# Patient Record
Sex: MALE | Race: WHITE | NOT HISPANIC OR LATINO | Employment: FULL TIME | URBAN - METROPOLITAN AREA
[De-identification: names, ages, dates, MRNs, and addresses within clinical notes are randomized per-mention and may not be internally consistent; named-entity substitution may affect disease eponyms.]

---

## 2017-04-28 DIAGNOSIS — Z94.0 LIVING-DONOR KIDNEY TRANSPLANT RECIPIENT: Chronic | ICD-10-CM

## 2017-04-28 DIAGNOSIS — Z94.0 KIDNEY TRANSPLANTED: ICD-10-CM

## 2017-04-28 DIAGNOSIS — N18.30 CKD (CHRONIC KIDNEY DISEASE) STAGE 3, GFR 30-59 ML/MIN: ICD-10-CM

## 2017-04-28 DIAGNOSIS — Z94.0 KIDNEY REPLACED BY TRANSPLANT: ICD-10-CM

## 2017-04-28 DIAGNOSIS — N05.1 FOCAL SEGMENTAL GLOMERULOSCLEROSIS: Chronic | ICD-10-CM

## 2017-04-28 DIAGNOSIS — Z94.0 IMMUNOSUPPRESSIVE MANAGEMENT ENCOUNTER FOLLOWING KIDNEY TRANSPLANT: Chronic | ICD-10-CM

## 2017-04-28 DIAGNOSIS — I12.0 HYPERTENSION, RENAL, STAGE 5 CHRONIC KIDNEY DISEASE OR END STAGE RENAL DISEASE: ICD-10-CM

## 2017-04-28 DIAGNOSIS — K76.0 FATTY LIVER: ICD-10-CM

## 2017-04-28 DIAGNOSIS — Z79.899 IMMUNOSUPPRESSIVE MANAGEMENT ENCOUNTER FOLLOWING KIDNEY TRANSPLANT: Chronic | ICD-10-CM

## 2017-04-28 RX ORDER — TACROLIMUS 1 MG/1
1 CAPSULE ORAL EVERY 12 HOURS
Qty: 60 CAPSULE | Refills: 3 | Status: SHIPPED | OUTPATIENT
Start: 2017-04-28 | End: 2017-10-30 | Stop reason: SDUPTHER

## 2017-04-28 RX ORDER — MYCOPHENOLATE MOFETIL 250 MG/1
500 CAPSULE ORAL EVERY 12 HOURS
Qty: 120 CAPSULE | Refills: 3 | Status: SHIPPED | OUTPATIENT
Start: 2017-04-28 | End: 2017-10-30 | Stop reason: SDUPTHER

## 2017-08-04 ENCOUNTER — DOCUMENTATION ONLY (OUTPATIENT)
Dept: TRANSPLANT | Facility: CLINIC | Age: 47
End: 2017-08-04

## 2017-08-04 LAB
EXT ALBUMIN: 3.7
EXT ALKALINE PHOSPHATASE: 103
EXT ALT: 62
EXT AST: 27
EXT BACTERIA UA: NORMAL
EXT BILIRUBIN DIRECT: 0.2 MG/DL
EXT BILIRUBIN TOTAL: 0.4
EXT BK VIRUS DNA QN PCR: NOT DETECTED
EXT BUN: 24
EXT CALCIUM: 8.9
EXT CHLORIDE: 103
EXT CO2: 26
EXT CREATININE: 1.49 MG/DL
EXT EOSINOPHIL%: 2
EXT GFR MDRD NON AF AMER: 50
EXT GLUCOSE UA: NORMAL
EXT GLUCOSE: 201
EXT HEMATOCRIT: 46.8
EXT HEMOGLOBIN: 15.5
EXT LYMPH%: 21
EXT MAGNESIUM: 2.1
EXT MONOCYTES%: 8
EXT NITRITES UA: NORMAL
EXT PHOSPHORUS: 2.1
EXT PLATELETS: 158
EXT POTASSIUM: 4.7
EXT PROT/CREAT RATIO UR: 0.73
EXT PROTEIN TOTAL: 6.9
EXT PROTEIN UA: 100
EXT SEGS%: 68
EXT SODIUM: 137 MMOL/L
EXT TACROLIMUS LVL: 6
EXT WBC UA: NORMAL
EXT WBC: 7.1

## 2017-08-10 NOTE — PROGRESS NOTES
Kidney Post-Transplant Assessment    Referring Physician: Bib Alfaro  Current Nephrologist: Bib Alfaro    ORGAN: LEFT KIDNEY  Donor Type: living  PHS Increased Risk: no  Cold Ischemia: 30 mins  Induction Medications: steroids (prednisone,methylprednisolone,solumedrol,medrol,decadron), campath - alemtuzumab (anti-cd52)    Subjective:      CC:  Reassessment of renal allograft function and management of chronic immunosuppression.    HPI:  Mr. Dockery is a 47 y.o. year old White male who received a living kidney transplant on 8/6/12.  He has CKD stage 3 - GFR 30-59 and his baseline creatinine is between 1.5-1.7, most recent sCr 1.49. He takes mycophenolate mofetil and tacrolimus for maintenance immunosuppression. He denies any recent hospitalizations or ER visits since his previous clinic visit.   Proteinuria since about 2015--> Most recent UPC 0.729 on 7/31/2017    Recently diagnosed with Diabetes type 2 this past year. Is taking Metformin 500 mg BID  Diabetes managed by general nephrologist   Lab Results   Component Value Date    HGBA1C 6.3 09/12/2016       Past Medical History:   Diagnosis Date    Anemia of chronic renal failure     CKD (chronic kidney disease) stage 4, GFR 15-29 ml/min     Depression     Focal segmental glomerulosclerosis 1986    biopsy proven    Gout, chronic     Hypertension, renal     Kidney transplant status, living related donor     Living-donor kidney transplant recipient 8/6/2012    Need for prophylactic immunotherapy 8/31/2012    Secondary hyperparathyroidism of renal origin     Vitamin D deficiency disease        Review of Systems   Constitutional: Negative for activity change, appetite change, chills, fatigue, fever and unexpected weight change.   HENT: Negative for congestion, facial swelling, postnasal drip, rhinorrhea, sinus pressure, sore throat and trouble swallowing.    Eyes: Negative for pain, redness and visual disturbance.   Respiratory: Negative for cough, chest  "tightness, shortness of breath and wheezing.    Cardiovascular: Negative.  Negative for chest pain, palpitations and leg swelling.   Gastrointestinal: Negative for abdominal pain, diarrhea, nausea and vomiting.   Genitourinary: Negative for dysuria, flank pain and urgency.   Musculoskeletal: Negative for gait problem, neck pain and neck stiffness.        Bilateral foot pain, diabetic neuropathy. Takes gabapentin, but feels it does not really help. Is planning on f/u with a podiatrist.    Skin: Negative for rash.   Allergic/Immunologic: Positive for immunocompromised state. Negative for environmental allergies and food allergies.   Neurological: Negative for dizziness, weakness, light-headedness and headaches.   Psychiatric/Behavioral: Negative for agitation and confusion. The patient is not nervous/anxious.        Objective:   Blood pressure 124/88, pulse 63, temperature 98.7 °F (37.1 °C), temperature source Oral, resp. rate 18, height 5' 9" (1.753 m), weight 110.4 kg (243 lb 6.2 oz), SpO2 99 %.body mass index is 35.94 kg/m².    Physical Exam   Constitutional: He is oriented to person, place, and time. He appears well-developed and well-nourished.   HENT:   Head: Normocephalic.   Mouth/Throat: Oropharynx is clear and moist. No oropharyngeal exudate.   Eyes: Conjunctivae and EOM are normal. Pupils are equal, round, and reactive to light. No scleral icterus.   Neck: Normal range of motion. Neck supple.   Cardiovascular: Normal rate, regular rhythm and normal heart sounds.    Pulmonary/Chest: Effort normal and breath sounds normal.   Abdominal: Soft. Normal appearance and bowel sounds are normal. He exhibits no distension and no mass. There is no splenomegaly or hepatomegaly. There is no tenderness. There is no rebound, no guarding, no CVA tenderness, no tenderness at McBurney's point and negative Zarco's sign.       Musculoskeletal: Normal range of motion. He exhibits no edema.   Lymphadenopathy:     He has no " cervical adenopathy.   Neurological: He is alert and oriented to person, place, and time. He exhibits normal muscle tone. Coordination normal.   Skin: Skin is warm and dry.   Psychiatric: He has a normal mood and affect. His behavior is normal.   Vitals reviewed.      Labs:  Lab Results   Component Value Date    EXTANC  10/11/2016      Comment:      repeat labs, hx proteinuria, creat elevated above baseline    EXTWBC 7.1 07/31/2017    EXTSEGS 68 07/31/2017    EXTPLATELETS 158 07/31/2017    EXTHEMOGLOBI 15.5 07/31/2017    EXTHEMATOCRI 46.8 07/31/2017    EXTCREATININ 1.49 07/31/2017    EXTSODIUM 137 07/31/2017    EXTPOTASSIUM 4.7 07/31/2017    EXTBUN 24 07/31/2017    EXTCO2 26 07/31/2017    EXTCALCIUM 8.9 07/31/2017    EXTPHOSPHORU 2.1 07/31/2017    EXTGLUCOSE 201 07/31/2017    EXTALBUMIN 3.7 07/31/2017    EXTAST 27 07/31/2017    EXTALT 62 07/31/2017    EXTBILITOTAL 0.4 07/31/2017       Lab Results   Component Value Date    EXTCYCLOSLVL  09/12/2016      Comment:      follow up labs after clinic visit    EXTTACROLVL 6.0 07/31/2017    EXTPROTCRE 0.729 07/31/2017    EXTPROTEINUA 100 07/31/2017    EXTWBCUA 0-5 07/31/2017    EXTRBCUA neg 08/17/2015       Labs were reviewed with the patient.    Assessment:     1. Living-donor kidney transplant recipient    2. Immunosuppressive management encounter following kidney transplant - Inducted with Campath 30 mg; Maintained on Prograf and Cellcept    3. CKD (chronic kidney disease) stage 3, GFR 30-59 ml/min    4. FSGS biopsy proven    5. Fatty liver    6. Hepatomegaly    7. Hypertension, renal, stage 1-4 or unspecified chronic kidney disease    8. Obesity, unspecified obesity severity, unspecified obesity type        Plan:   Needs to f/u with general nephrologist for:   CKD care,  proteinuria MgMt , good BP control and maintain appropriate weight   neuropathy--> f/u with podiatry as scheduled    Follow-up:   1. CKD stage: 3 stable    2. Immunosuppression:   Prograf Trough 6.0 ,  which is therapeutic--> target 4-6. Continue Prograf 1/1,  mg BID    Will continue to monitor for drug toxicities.  EXTTACROLVL 6.0 07/31/2017     3. Allograft Function: Stable.  Continue good po hydration .        EXTCREATININ 1.49 07/31/2017     4. Hypertension management: controlled, advise low salt diet and home BP monitoring   lisinopril 5 mg QD     5. Metabolic Bone Disease/Secondary Hyperparathyroidism:stable.    Will monitor Vit D, PTH and CA / protocol   EXTCALCIUM 8.9 07/31/2017   EXTPHOSPHORU 2.1 07/31/2017       6. Electrolytes: Normal calcium. Bicarbonate is normal     EXTSODIUM 137 07/31/2017   EXTPOTASSIUM 4.7 07/31/2017   EXTBUN 24 07/31/2017   EXTCO2 26 07/31/2017     7. Anemia: stable. No need for intervention       EXTWBC 7.1 07/31/2017   EXTSEGS 68 07/31/2017   EXTPLATELETS 158 07/31/2017   EXTHEMOGLOBI 15.5 07/31/2017   EXTHEMATOCRI 46.8 07/31/2017     8.  Cytopenias: no significant cytopenias will monitor as per our guidelines. Medicine list reviewed including potential causes of drug-induced cytopenias    9.Proteinuria: continue p/c ratio as per guidelines    MGMT deferred to general nephrologist   7/31/2017  EXT PROT/CREAT Ratio UR  0.729       10. BK virus infection screening:   will continue to monitor/ guidelines  7/31/2017  EXT BK Virus DNA QN PCR   not detected       11. Weight education: provided during the clinic visit   Body mass index is 35.94 kg/m².          12.Patient safety education regarding immunosuppression including prophylaxis posttransplant for CMV, PCP : Education provided about vaccination and prevention of infections       Follow-up:   Annual follow-up with kidney transplant clinic with repeat labs, including renal function panel with UA, urine protein/creatinine ratio, and drug trough level q3 months.  Patient also reminded to follow-up with general nephrologist.    Iona Lynch NP       Education:   Material provided to the patient.  Patient reminded to call  with any health changes since these can be early signs of significant complications.  Also, I advised the patient to be sure any new medications or changes of old medications are discussed with either a pharmacist or physician knowledgeable with transplant to avoid rejection/drug toxicity related to significant drug interactions.    UNOS Patient Status  Functional Status: 80% - Normal activity with effort: some symptoms of disease  Physical Capacity: No Limitations

## 2017-08-11 ENCOUNTER — OFFICE VISIT (OUTPATIENT)
Dept: TRANSPLANT | Facility: CLINIC | Age: 47
End: 2017-08-11
Payer: COMMERCIAL

## 2017-08-11 VITALS
SYSTOLIC BLOOD PRESSURE: 124 MMHG | HEART RATE: 63 BPM | HEIGHT: 69 IN | BODY MASS INDEX: 36.05 KG/M2 | DIASTOLIC BLOOD PRESSURE: 88 MMHG | OXYGEN SATURATION: 99 % | WEIGHT: 243.38 LBS | TEMPERATURE: 99 F | RESPIRATION RATE: 18 BRPM

## 2017-08-11 DIAGNOSIS — Z79.899 IMMUNOSUPPRESSIVE MANAGEMENT ENCOUNTER FOLLOWING KIDNEY TRANSPLANT: Chronic | ICD-10-CM

## 2017-08-11 DIAGNOSIS — N18.30 CKD (CHRONIC KIDNEY DISEASE) STAGE 3, GFR 30-59 ML/MIN: ICD-10-CM

## 2017-08-11 DIAGNOSIS — R16.0 HEPATOMEGALY: ICD-10-CM

## 2017-08-11 DIAGNOSIS — Z94.0 LIVING-DONOR KIDNEY TRANSPLANT RECIPIENT: Primary | Chronic | ICD-10-CM

## 2017-08-11 DIAGNOSIS — N05.1 FOCAL SEGMENTAL GLOMERULOSCLEROSIS: Chronic | ICD-10-CM

## 2017-08-11 DIAGNOSIS — E66.9 OBESITY, UNSPECIFIED OBESITY SEVERITY, UNSPECIFIED OBESITY TYPE: Chronic | ICD-10-CM

## 2017-08-11 DIAGNOSIS — Z94.0 IMMUNOSUPPRESSIVE MANAGEMENT ENCOUNTER FOLLOWING KIDNEY TRANSPLANT: Chronic | ICD-10-CM

## 2017-08-11 DIAGNOSIS — K76.0 FATTY LIVER: ICD-10-CM

## 2017-08-11 DIAGNOSIS — I12.9 HYPERTENSION, RENAL, STAGE 1-4 OR UNSPECIFIED CHRONIC KIDNEY DISEASE: ICD-10-CM

## 2017-08-11 PROCEDURE — 3008F BODY MASS INDEX DOCD: CPT | Mod: S$GLB,,, | Performed by: NURSE PRACTITIONER

## 2017-08-11 PROCEDURE — 3079F DIAST BP 80-89 MM HG: CPT | Mod: S$GLB,,, | Performed by: NURSE PRACTITIONER

## 2017-08-11 PROCEDURE — 3074F SYST BP LT 130 MM HG: CPT | Mod: S$GLB,,, | Performed by: NURSE PRACTITIONER

## 2017-08-11 PROCEDURE — 99999 PR PBB SHADOW E&M-EST. PATIENT-LVL IV: CPT | Mod: PBBFAC,,, | Performed by: NURSE PRACTITIONER

## 2017-08-11 PROCEDURE — 99214 OFFICE O/P EST MOD 30 MIN: CPT | Mod: S$GLB,,, | Performed by: NURSE PRACTITIONER

## 2017-08-11 RX ORDER — METFORMIN HYDROCHLORIDE 500 MG/1
500 TABLET ORAL 2 TIMES DAILY WITH MEALS
COMMUNITY
End: 2021-08-23

## 2017-08-11 RX ORDER — GABAPENTIN 300 MG/1
300 CAPSULE ORAL 2 TIMES DAILY
COMMUNITY

## 2017-08-11 NOTE — LETTER
August 11, 2017        Bib Alfaro  [address] [place] [address]  Phone: [phone]  Fax: [phone]             [facility]- [facility]  [address]  [facility] [address]  Phone: [phone]   Patient: Irving Dockery   MR Number: [id number]   YOB: 1970   Date of Visit: 8/11/2017       Dear Dr. iBb Alfaro    Thank you for referring Irving Dockery to me for evaluation. Attached you will find relevant portions of my assessment and plan of care.    If you have questions, please do not hesitate to call me. I look forward to following Irving Dockery along with you.    Sincerely,    Iona Lynch NP    Enclosure    If you would like to receive this communication electronically, please contact [email or web address] or [phone] to request [employer] Link access.    [employer] Link is a tool which provides read-only access to select patient information with whom you have a relationship. Its easy to use and provides real time access to review your patients record including encounter summaries, notes, results, and demographic information.    If you feel you have received this communication in error or would no longer like to receive these types of communications, please e-mail [email or web address]

## 2017-08-16 ENCOUNTER — COMMITTEE REVIEW (OUTPATIENT)
Dept: TRANSPLANT | Facility: CLINIC | Age: 47
End: 2017-08-16

## 2017-08-16 NOTE — COMMITTEE REVIEW
Discussion per Iona: plan of care     Will discuss with Iona what specifically the concern is     I was present at the meeting and attest to the decision of the committee.    Taniya Butler MD

## 2017-10-30 DIAGNOSIS — N05.1 FOCAL SEGMENTAL GLOMERULOSCLEROSIS: Chronic | ICD-10-CM

## 2017-10-30 DIAGNOSIS — N18.30 CKD (CHRONIC KIDNEY DISEASE) STAGE 3, GFR 30-59 ML/MIN: ICD-10-CM

## 2017-10-30 DIAGNOSIS — Z79.899 IMMUNOSUPPRESSIVE MANAGEMENT ENCOUNTER FOLLOWING KIDNEY TRANSPLANT: Chronic | ICD-10-CM

## 2017-10-30 DIAGNOSIS — Z94.0 LIVING-DONOR KIDNEY TRANSPLANT RECIPIENT: Chronic | ICD-10-CM

## 2017-10-30 DIAGNOSIS — Z94.0 IMMUNOSUPPRESSIVE MANAGEMENT ENCOUNTER FOLLOWING KIDNEY TRANSPLANT: Chronic | ICD-10-CM

## 2017-10-30 DIAGNOSIS — K76.0 FATTY LIVER: ICD-10-CM

## 2017-10-30 DIAGNOSIS — Z94.0 KIDNEY TRANSPLANTED: ICD-10-CM

## 2017-10-30 DIAGNOSIS — Z94.0 KIDNEY REPLACED BY TRANSPLANT: ICD-10-CM

## 2017-10-30 RX ORDER — TACROLIMUS 1 MG/1
1 CAPSULE ORAL EVERY 12 HOURS
Qty: 60 CAPSULE | Refills: 11 | Status: SHIPPED | OUTPATIENT
Start: 2017-10-30 | End: 2018-03-13 | Stop reason: SDUPTHER

## 2017-10-30 RX ORDER — MYCOPHENOLATE MOFETIL 250 MG/1
500 CAPSULE ORAL EVERY 12 HOURS
Qty: 120 CAPSULE | Refills: 11 | Status: SHIPPED | OUTPATIENT
Start: 2017-10-30 | End: 2018-03-13 | Stop reason: SDUPTHER

## 2017-10-30 NOTE — TELEPHONE ENCOUNTER
----- Message from Marcus Roman sent at 10/30/2017  4:17 PM CDT -----  Contact: patient  Patient called about medication refill. mycophenolate (CELLCEPT) 250 mg Cap and tacrolimus (PROGRAF) 1 MG Cap        Please call [phone]      Thanks!!!

## 2018-03-13 DIAGNOSIS — Z94.0 IMMUNOSUPPRESSIVE MANAGEMENT ENCOUNTER FOLLOWING KIDNEY TRANSPLANT: Chronic | ICD-10-CM

## 2018-03-13 DIAGNOSIS — N18.30 CKD (CHRONIC KIDNEY DISEASE) STAGE 3, GFR 30-59 ML/MIN: ICD-10-CM

## 2018-03-13 DIAGNOSIS — Z94.0 LIVING-DONOR KIDNEY TRANSPLANT RECIPIENT: Chronic | ICD-10-CM

## 2018-03-13 DIAGNOSIS — Z79.899 IMMUNOSUPPRESSIVE MANAGEMENT ENCOUNTER FOLLOWING KIDNEY TRANSPLANT: Chronic | ICD-10-CM

## 2018-03-13 DIAGNOSIS — Z94.0 KIDNEY REPLACED BY TRANSPLANT: ICD-10-CM

## 2018-03-13 DIAGNOSIS — N05.1 FOCAL SEGMENTAL GLOMERULOSCLEROSIS: Chronic | ICD-10-CM

## 2018-03-13 DIAGNOSIS — Z94.0 KIDNEY TRANSPLANTED: ICD-10-CM

## 2018-03-13 DIAGNOSIS — K76.0 FATTY LIVER: ICD-10-CM

## 2018-03-13 RX ORDER — MYCOPHENOLATE MOFETIL 250 MG/1
500 CAPSULE ORAL EVERY 12 HOURS
Qty: 120 CAPSULE | Refills: 5 | Status: SHIPPED | OUTPATIENT
Start: 2018-03-13 | End: 2018-03-22 | Stop reason: SDUPTHER

## 2018-03-13 RX ORDER — TACROLIMUS 1 MG/1
1 CAPSULE ORAL EVERY 12 HOURS
Qty: 60 CAPSULE | Refills: 5 | Status: SHIPPED | OUTPATIENT
Start: 2018-03-13 | End: 2018-03-22 | Stop reason: SDUPTHER

## 2018-03-13 NOTE — TELEPHONE ENCOUNTER
----- Message from Shasha Casanova sent at 3/13/2018  9:25 AM CDT -----  Contact: Pt  Pt states he is now using [facility]  as his new pharmacy Ph# [phone] Fax# [phone]    Please send refills of Prograf and Cellcept     Contact number [phone]  Thanks

## 2018-03-13 NOTE — TELEPHONE ENCOUNTER
Patient call returned. He has new insurance and wishes to use [facility] mailorder pharmacy. Patient wishes to have immunos scripts transferred.

## 2018-03-22 DIAGNOSIS — Z94.0 IMMUNOSUPPRESSIVE MANAGEMENT ENCOUNTER FOLLOWING KIDNEY TRANSPLANT: Chronic | ICD-10-CM

## 2018-03-22 DIAGNOSIS — Z94.0 KIDNEY TRANSPLANTED: ICD-10-CM

## 2018-03-22 DIAGNOSIS — N18.30 CKD (CHRONIC KIDNEY DISEASE) STAGE 3, GFR 30-59 ML/MIN: ICD-10-CM

## 2018-03-22 DIAGNOSIS — K76.0 FATTY LIVER: ICD-10-CM

## 2018-03-22 DIAGNOSIS — Z94.0 LIVING-DONOR KIDNEY TRANSPLANT RECIPIENT: Chronic | ICD-10-CM

## 2018-03-22 DIAGNOSIS — Z94.0 KIDNEY REPLACED BY TRANSPLANT: ICD-10-CM

## 2018-03-22 DIAGNOSIS — N05.1 FOCAL SEGMENTAL GLOMERULOSCLEROSIS: Chronic | ICD-10-CM

## 2018-03-22 DIAGNOSIS — Z79.899 IMMUNOSUPPRESSIVE MANAGEMENT ENCOUNTER FOLLOWING KIDNEY TRANSPLANT: Chronic | ICD-10-CM

## 2018-03-22 RX ORDER — TACROLIMUS 1 MG/1
1 CAPSULE ORAL EVERY 12 HOURS
Qty: 60 CAPSULE | Refills: 5 | Status: SHIPPED | OUTPATIENT
Start: 2018-03-22 | End: 2018-08-27 | Stop reason: SDUPTHER

## 2018-03-22 RX ORDER — MYCOPHENOLATE MOFETIL 250 MG/1
500 CAPSULE ORAL EVERY 12 HOURS
Qty: 120 CAPSULE | Refills: 5 | Status: SHIPPED | OUTPATIENT
Start: 2018-03-22 | End: 2018-08-27 | Stop reason: SDUPTHER

## 2018-05-21 ENCOUNTER — HISTORICAL (OUTPATIENT)
Dept: ADMINISTRATIVE | Facility: HOSPITAL | Age: 48
End: 2018-05-21

## 2018-08-15 ENCOUNTER — DOCUMENTATION ONLY (OUTPATIENT)
Dept: TRANSPLANT | Facility: CLINIC | Age: 48
End: 2018-08-15

## 2018-08-15 LAB
EXT ALBUMIN: 3.9
EXT ALKALINE PHOSPHATASE: 65
EXT ALT: 41
EXT AST: 28
EXT BILIRUBIN DIRECT: 0.2 MG/DL
EXT BILIRUBIN TOTAL: 0.5
EXT BUN: 31
EXT CALCIUM: 9.3
EXT CHLORIDE: 104
EXT CO2: 25
EXT CREATININE: 2.08 MG/DL
EXT EOSINOPHIL%: 4
EXT GFR MDRD AF AMER: 41
EXT GFR MDRD NON AF AMER: 34
EXT GLUCOSE: 131
EXT HEMATOCRIT: 42.4
EXT HEMOGLOBIN: 14.1
EXT LYMPH%: 22
EXT MAGNESIUM: 2.1
EXT MONOCYTES%: 9
EXT PHOSPHORUS: 3.9
EXT PLATELETS: 140
EXT POTASSIUM: 4.8
EXT PROT/CREAT RATIO UR: 0.33
EXT PROTEIN TOTAL: 6.8
EXT SEGS%: 65
EXT SODIUM: 137 MMOL/L
EXT TACROLIMUS LVL: 5.2
EXT WBC: 6.8
GGT SERPL-CCNC: 116 U/L

## 2018-08-16 ENCOUNTER — TELEPHONE (OUTPATIENT)
Dept: TRANSPLANT | Facility: CLINIC | Age: 48
End: 2018-08-16

## 2018-08-16 DIAGNOSIS — Z94.0 KIDNEY REPLACED BY TRANSPLANT: Primary | ICD-10-CM

## 2018-08-16 NOTE — TELEPHONE ENCOUNTER
Call placed, labs reviewed by Dr. Meredith. Patient advised to increase PO hydration and repeat labs on 8/27/18.

## 2018-08-26 PROBLEM — D84.9 IMMUNOCOMPROMISED STATE: Status: ACTIVE | Noted: 2018-08-26

## 2018-08-26 NOTE — PROGRESS NOTES
Kidney Post-Transplant Assessment    Referring Physician: Bib Alfaro  Current Nephrologist: Bib Alfaro    ORGAN: LEFT KIDNEY  Donor Type: living  PHS Increased Risk: no  Cold Ischemia: 30 mins  Induction Medications: steroids (prednisone,methylprednisolone,solumedrol,medrol,decadron), campath - alemtuzumab (anti-cd52)    Subjective:     CC:  Reassessment of renal allograft function and management of chronic immunosuppression.    HPI:  Mr. Dockery is a 48 y.o. year old White male who received a living kidney transplant on 8/6/12.  He has CKD stage 3 - GFR 30-59 and his baseline creatinine is between 1.4 to 1.9. He takes mycophenolic acid and tacrolimus for maintenance immunosuppression. He denies any recent hospitalizations or ER visits since his previous clinic visit.      Patent says that his DM is well controlled.    He was also diagnosed with neuropathy and carpal tunnel syndrome     He is following with Dr CONSTANCE Schmid In [place].    Otherwise he feels great, very pleased with the outcome of the kidney transplant    Patient works in [place] 2 hrs away from his hometown in [place].         Current Outpatient Medications on File Prior to Visit   Medication Sig Dispense Refill    gabapentin (NEURONTIN) 300 MG capsule Take 300 mg by mouth 2 (two) times daily.       lisinopril (PRINIVIL,ZESTRIL) 5 MG tablet Take 2 tablets (10 mg total) by mouth once daily. 180 tablet 3    multivitamin (MULTIVITAMIN) per tablet Take 1 tablet by mouth once daily.        pioglitazone (ACTOS) 30 MG tablet Take 30 mg by mouth once daily.      SITagliptan-metformin (JANUMET) 50-1,000 mg per tablet Take 1 tablet by mouth 2 (two) times daily with meals.      [DISCONTINUED] mycophenolate (CELLCEPT) 250 mg Cap Take 2 capsules (500 mg total) by mouth every 12 (twelve) hours. Z94.0 Kidney Transplant 8/6/2012 120 capsule 5    [DISCONTINUED] tacrolimus (PROGRAF) 1 MG Cap Take 1 capsule (1 mg total) by mouth every 12 (twelve) hours. Z94.0  "Kidney Transplant 8/6/2012. 60 capsule 5    metformin (GLUCOPHAGE) 500 MG tablet Take 500 mg by mouth 2 (two) times daily with meals.      VIAGRA 100 mg tablet Take 100 mg by mouth continuous prn.       No current facility-administered medications on file prior to visit.         Review of Systems     Skin: no skin rash  CNS; no headaches, blurred vision, seizure, or syncope  ENT: No JVD,  Adenopathies,  nasal congestion. No oral lesions  Cardiac: No chest pain, dyspnea, claudication, edema or palpitations  Respiratory: No SOB, cough, hemoptysis   Gastro-intestinal: No diarrhea, constipation, abdominal pain, nausea, vomit. No ascitis  Genitourinary: no hematuria, dysuria, frequency, frequency  Musculoskeletal: joint pain, arthritis or vasculitic changes  Psych: alert awake, oriented, No cranial nerves deficit.      Objective:       Physical Exam     /77 (BP Location: Right arm, Patient Position: Sitting, BP Method: Medium (Automatic))   Pulse 88   Temp 98.3 °F (36.8 °C) (Oral)   Resp 17   Ht 5' 9" (1.753 m)   Wt 112 kg (246 lb 14.6 oz)   SpO2 99%   BMI 36.46 kg/m²       Head: normocephalic  Neck: No JVD, cervical axillary, or femoral adenopathies  Heart: no murmurs, Normal s1 and s2, No gallops, no rubs, No murmurs  Lungs; CTA, good respiratory effort, no crackles  Abdomen: soft, non tender, no splenomegaly or hepatomegaly, no massess, no bruits  Extremities: No edema, skin rash, joint pain  SNC: awake, alert oriented. Cranial nerves are intact, no focalized, sensitivity and strength preserved      Labs:      Lab Results   Component Value Date    EXTANC  10/11/2016      Comment:      repeat labs, hx proteinuria, creat elevated above baseline    EXTWBC 6.8 08/14/2018    EXTSEGS 65 08/14/2018    EXTPLATELETS 140 08/14/2018    EXTHEMOGLOBI 14.1 08/14/2018    EXTHEMATOCRI 42.4 08/14/2018    EXTCREATININ 2.08 08/14/2018    EXTSODIUM 137 08/14/2018    EXTPOTASSIUM 4.8 08/14/2018    EXTBUN 31 08/14/2018    " EXTCO2 25 08/14/2018    EXTCALCIUM 9.3 08/14/2018    EXTPHOSPHORU 3.9 08/14/2018    EXTGLUCOSE 131 08/14/2018    EXTALBUMIN 3.9 08/14/2018    EXTAST 28 08/14/2018    EXTALT 41 08/14/2018    EXTBILITOTAL 0.5 08/14/2018       Lab Results   Component Value Date    EXTCYCLOSLVL  09/12/2016      Comment:      follow up labs after clinic visit    EXTTACROLVL 5.2 08/14/2018    EXTPROTCRE 0.333 08/14/2018    EXTPROTEINUA 100 07/31/2017    EXTWBCUA 0-5 07/31/2017    EXTRBCUA neg 08/17/2015       Labs were reviewed with the patient.    Assessment:     1. Living-donor kidney transplant recipient    2. FSGS biopsy proven    3. Immunosuppressive management encounter following kidney transplant - Inducted with Campath 30 mg; Maintained on Prograf and Cellcept    4. Hypertension, renal    5. Renal cyst on renal US from 04/2012    6. Class 2 obesity with serious comorbidity in adult, unspecified BMI, unspecified obesity type    7. Immunocompromised state        Plan:     Results for AYSE WOODS (MRN [id number]) as of 8/27/2018 11:46   Ref. Range 8/27/2018 09:15   Sodium Latest Ref Range: 136 - 145 mmol/L 137   Potassium Latest Ref Range: 3.5 - 5.1 mmol/L 4.8   Chloride Latest Ref Range: 95 - 110 mmol/L 103   CO2 Latest Ref Range: 23 - 29 mmol/L 26   Anion Gap Latest Ref Range: 8 - 16 mmol/L 8   BUN, Bld Latest Ref Range: 6 - 20 mg/dL 27 (H)   Creatinine Latest Ref Range: 0.5 - 1.4 mg/dL 1.6 (H)   eGFR if non African American Latest Ref Range: >60 mL/min/1.73 m^2 49.8 (A)   eGFR if African American Latest Ref Range: >60 mL/min/1.73 m^2 57.6 (A)   Glucose Latest Ref Range: 70 - 110 mg/dL 121 (H)   Calcium Latest Ref Range: 8.7 - 10.5 mg/dL 9.2   Phosphorus Latest Ref Range: 2.7 - 4.5 mg/dL 3.2   Albumin Latest Ref Range: 3.5 - 5.2 g/dL 4.0             1. CKD stage: stage 3 with creatinine at baseline of 1.6 . Prograf at target Education provided in appropriate fluid intake, potassium intake. Continue with oral hydration    2.  Immunosuppression: same dose of prograf and MMF for now. Labs today are normal. No need for any change. 11 refills sent to the specialty pharmacy today.      Will closely monitor for toxicities, education provided about adherence to medicines and need to communicate any side effct to the transplant nurse or physician    3. Allograft Function:creatinine at baseline, good pr/cr ratio  will advise hydration and repeat creatinine in 10 days. Advise nephrologist closer monitoring       4. Hypertension management: well controlled at home. Normal BP for this clinic encounter Continue with home blood pressure monitoring, low salt and healthy life discussed with the patient    5. Metabolic Bone Disease/Secondary Hyperparathyroidism: calcium and phosphorus as per our center protocol. Will monitor PTH, Vit D level, calcium          6. Electrolytes: reviewed with the patient, essentially within the normal range no need for acute changes today, will monitor as per our center guidelines         7. Anemia: normal h/h. will continue monitoring as per our center guidelines. No indication for acute intervention today         8.Proteinuria:last pr/cr ratio was 0.3. will continue with pr/cr ratio as per our center guidelines       9. BK virus infection screening: last serum BK was July 2017 was  undetectable. will continue with urine or blood PCR as per our guidelines to prevent BK virus viremia and allograft dysfunction. We screen for 5 yrs posttransplant. He is currenltly 6 yrs out.        10. Weight education: provided during the clinic visit. Patient is making an effort to lose some wt. He states that has chnaged his diet. I can see that his fasting glucose is better, less proteinuria and he states that is following closely with his PCP for DM control. States some changes to his meds for diabetes control.    Body mass index is 36.46 kg/m².       11.Patient safety education regarding immunosuppression including prophylaxis  posttransplant for CMV, PCP : Education provided about vaccination and prevention of infections    12.  Cytopenias: no significant cytopenias will monitor as per our guidelines. Medicine list reviewed including potential causes of drug-induced cytopenias         13. Post-transplant Prophylaxis; CMV Infection, PJP and Candida mucosistis and other indicated for this particular patient. No indications for immunoprophylaxis at the p[resent time. Transplant is >1 yr. No episodes of rejection     I spoke with the patient for 30 minutes. More than half dedicated to counseling and education. All questions answered            Follow-up:   Annual follow-up with kidney transplant clinic with repeat labs, including renal function panel with UA, urine protein/creatinine ratio, and drug trough level q3 months.  Patient also reminded to follow-up with general nephrologist.    Leonardo Meredith MD       Education:   Material provided to the patient.  Patient reminded to call with any health changes since these can be early signs of significant complications.  Also, I advised the patient to be sure any new medications or changes of old medications are discussed with either a pharmacist or physician knowledgeable with transplant to avoid rejection/drug toxicity related to significant drug interactions.    UNOS Patient Status  Functional Status: 80% - Normal activity with effort: some symptoms of disease  Physical Capacity: No Limitations

## 2018-08-27 ENCOUNTER — OFFICE VISIT (OUTPATIENT)
Dept: TRANSPLANT | Facility: CLINIC | Age: 48
End: 2018-08-27
Payer: COMMERCIAL

## 2018-08-27 ENCOUNTER — LAB VISIT (OUTPATIENT)
Dept: LAB | Facility: HOSPITAL | Age: 48
End: 2018-08-27
Attending: INTERNAL MEDICINE
Payer: COMMERCIAL

## 2018-08-27 VITALS
HEART RATE: 88 BPM | TEMPERATURE: 98 F | HEIGHT: 69 IN | OXYGEN SATURATION: 99 % | WEIGHT: 246.94 LBS | RESPIRATION RATE: 17 BRPM | SYSTOLIC BLOOD PRESSURE: 117 MMHG | BODY MASS INDEX: 36.57 KG/M2 | DIASTOLIC BLOOD PRESSURE: 77 MMHG

## 2018-08-27 DIAGNOSIS — Z94.0 KIDNEY REPLACED BY TRANSPLANT: ICD-10-CM

## 2018-08-27 DIAGNOSIS — N18.30 CKD (CHRONIC KIDNEY DISEASE) STAGE 3, GFR 30-59 ML/MIN: ICD-10-CM

## 2018-08-27 DIAGNOSIS — D84.9 IMMUNOCOMPROMISED STATE: ICD-10-CM

## 2018-08-27 DIAGNOSIS — Z79.899 IMMUNOSUPPRESSIVE MANAGEMENT ENCOUNTER FOLLOWING KIDNEY TRANSPLANT: Chronic | ICD-10-CM

## 2018-08-27 DIAGNOSIS — Z94.0 IMMUNOSUPPRESSIVE MANAGEMENT ENCOUNTER FOLLOWING KIDNEY TRANSPLANT: Chronic | ICD-10-CM

## 2018-08-27 DIAGNOSIS — N05.1 FOCAL SEGMENTAL GLOMERULOSCLEROSIS: Chronic | ICD-10-CM

## 2018-08-27 DIAGNOSIS — I12.9 HYPERTENSION, RENAL: ICD-10-CM

## 2018-08-27 DIAGNOSIS — K76.0 FATTY LIVER: ICD-10-CM

## 2018-08-27 DIAGNOSIS — N28.1 RENAL CYST: ICD-10-CM

## 2018-08-27 DIAGNOSIS — Z94.0 KIDNEY TRANSPLANTED: ICD-10-CM

## 2018-08-27 DIAGNOSIS — Z94.0 LIVING-DONOR KIDNEY TRANSPLANT RECIPIENT: Primary | Chronic | ICD-10-CM

## 2018-08-27 LAB
ALBUMIN SERPL BCP-MCNC: 4 G/DL
ANION GAP SERPL CALC-SCNC: 8 MMOL/L
BUN SERPL-MCNC: 27 MG/DL
CALCIUM SERPL-MCNC: 9.2 MG/DL
CHLORIDE SERPL-SCNC: 103 MMOL/L
CO2 SERPL-SCNC: 26 MMOL/L
CREAT SERPL-MCNC: 1.6 MG/DL
EST. GFR  (AFRICAN AMERICAN): 57.6 ML/MIN/1.73 M^2
EST. GFR  (NON AFRICAN AMERICAN): 49.8 ML/MIN/1.73 M^2
GLUCOSE SERPL-MCNC: 121 MG/DL
PHOSPHATE SERPL-MCNC: 3.2 MG/DL
POTASSIUM SERPL-SCNC: 4.8 MMOL/L
SODIUM SERPL-SCNC: 137 MMOL/L

## 2018-08-27 PROCEDURE — 99215 OFFICE O/P EST HI 40 MIN: CPT | Mod: S$GLB,,, | Performed by: INTERNAL MEDICINE

## 2018-08-27 PROCEDURE — 3078F DIAST BP <80 MM HG: CPT | Mod: CPTII,S$GLB,, | Performed by: INTERNAL MEDICINE

## 2018-08-27 PROCEDURE — 3008F BODY MASS INDEX DOCD: CPT | Mod: CPTII,S$GLB,, | Performed by: INTERNAL MEDICINE

## 2018-08-27 PROCEDURE — 36415 COLL VENOUS BLD VENIPUNCTURE: CPT

## 2018-08-27 PROCEDURE — 99999 PR PBB SHADOW E&M-EST. PATIENT-LVL III: CPT | Mod: PBBFAC,,, | Performed by: INTERNAL MEDICINE

## 2018-08-27 PROCEDURE — 80069 RENAL FUNCTION PANEL: CPT

## 2018-08-27 PROCEDURE — 3074F SYST BP LT 130 MM HG: CPT | Mod: CPTII,S$GLB,, | Performed by: INTERNAL MEDICINE

## 2018-08-27 RX ORDER — TACROLIMUS 1 MG/1
1 CAPSULE ORAL EVERY 12 HOURS
Qty: 60 CAPSULE | Refills: 11 | Status: SHIPPED | OUTPATIENT
Start: 2018-08-27 | End: 2019-06-19 | Stop reason: SDUPTHER

## 2018-08-27 RX ORDER — PIOGLITAZONEHYDROCHLORIDE 30 MG/1
15 TABLET ORAL DAILY
COMMUNITY

## 2018-08-27 RX ORDER — MYCOPHENOLATE MOFETIL 250 MG/1
500 CAPSULE ORAL EVERY 12 HOURS
Qty: 120 CAPSULE | Refills: 11 | Status: SHIPPED | OUTPATIENT
Start: 2018-08-27 | End: 2019-06-19 | Stop reason: SDUPTHER

## 2018-08-27 NOTE — LETTER
August 27, 2018        Bib Alfaro  [address] [place] [address]  Phone: [phone]  Fax: [phone]             [facility]- [facility]  [address]  [facility] [address]  Phone: [phone]   Patient: Irving Dockery   MR Number: [id number]   YOB: 1970   Date of Visit: 8/27/2018       Dear Dr. Bib Alfaro    Thank you for referring Irving Dockery to me for evaluation. Attached you will find relevant portions of my assessment and plan of care.    If you have questions, please do not hesitate to call me. I look forward to following Irving Dockery along with you.    Sincerely,    Leonardo Meredith MD    Enclosure    If you would like to receive this communication electronically, please contact [email or web address] or [phone] to request [employer] Link access.    [employer] Link is a tool which provides read-only access to select patient information with whom you have a relationship. Its easy to use and provides real time access to review your patients record including encounter summaries, notes, results, and demographic information.    If you feel you have received this communication in error or would no longer like to receive these types of communications, please e-mail [email or web address]

## 2018-08-30 ENCOUNTER — PATIENT MESSAGE (OUTPATIENT)
Dept: TRANSPLANT | Facility: CLINIC | Age: 48
End: 2018-08-30

## 2019-03-26 ENCOUNTER — HISTORICAL (OUTPATIENT)
Dept: ADMINISTRATIVE | Facility: HOSPITAL | Age: 49
End: 2019-03-26

## 2019-06-19 DIAGNOSIS — Z94.0 IMMUNOSUPPRESSIVE MANAGEMENT ENCOUNTER FOLLOWING KIDNEY TRANSPLANT: Chronic | ICD-10-CM

## 2019-06-19 DIAGNOSIS — N05.1 FOCAL SEGMENTAL GLOMERULOSCLEROSIS: Chronic | ICD-10-CM

## 2019-06-19 DIAGNOSIS — Z94.0 KIDNEY REPLACED BY TRANSPLANT: ICD-10-CM

## 2019-06-19 DIAGNOSIS — Z94.0 KIDNEY TRANSPLANTED: ICD-10-CM

## 2019-06-19 DIAGNOSIS — N18.30 CKD (CHRONIC KIDNEY DISEASE) STAGE 3, GFR 30-59 ML/MIN: ICD-10-CM

## 2019-06-19 DIAGNOSIS — Z79.899 IMMUNOSUPPRESSIVE MANAGEMENT ENCOUNTER FOLLOWING KIDNEY TRANSPLANT: Chronic | ICD-10-CM

## 2019-06-19 DIAGNOSIS — Z94.0 LIVING-DONOR KIDNEY TRANSPLANT RECIPIENT: Chronic | ICD-10-CM

## 2019-06-19 DIAGNOSIS — K76.0 FATTY LIVER: ICD-10-CM

## 2019-06-19 RX ORDER — TACROLIMUS 1 MG/1
CAPSULE ORAL
Qty: 60 CAPSULE | Refills: 10 | Status: SHIPPED | OUTPATIENT
Start: 2019-06-19 | End: 2020-06-05

## 2019-06-19 RX ORDER — MYCOPHENOLATE MOFETIL 250 MG/1
CAPSULE ORAL
Qty: 120 CAPSULE | Refills: 10 | Status: SHIPPED | OUTPATIENT
Start: 2019-06-19 | End: 2020-06-05

## 2019-08-16 ENCOUNTER — DOCUMENTATION ONLY (OUTPATIENT)
Dept: TRANSPLANT | Facility: CLINIC | Age: 49
End: 2019-08-16

## 2019-08-16 ENCOUNTER — TELEPHONE (OUTPATIENT)
Dept: TRANSPLANT | Facility: CLINIC | Age: 49
End: 2019-08-16

## 2019-08-16 LAB
EXT ALBUMIN: 4 (ref 3.5–5)
EXT ALKALINE PHOSPHATASE: 56 (ref 40–150)
EXT ALT: 36 (ref 5–50)
EXT AST: 24 (ref 10–58)
EXT BACTERIA UA: ABNORMAL
EXT BILIRUBIN DIRECT: 0.3 MG/DL (ref 0–0.5)
EXT BILIRUBIN TOTAL: 0.7 (ref 0.2–1.2)
EXT BK VIRUS DNA QN PCR: ABNORMAL
EXT BUN: 21 (ref 5–25)
EXT CALCIUM: 8.8 (ref 8.8–10.6)
EXT CHLORIDE: 103 (ref 100–109)
EXT CO2: 29 (ref 22–33)
EXT CREATININE: 1.81 MG/DL (ref 0.57–1.25)
EXT EOSINOPHIL%: 4 (ref 0–7)
EXT GFR MDRD NON AF AMER: 40
EXT GLUCOSE UA: ABNORMAL
EXT GLUCOSE: 131 (ref 70–100)
EXT HEMATOCRIT: 43.6 (ref 42–52)
EXT HEMOGLOBIN: 14.4 (ref 14–18)
EXT LYMPH%: 23 (ref 21–47)
EXT MAGNESIUM: 2.2 (ref 1.6–2.6)
EXT MONOCYTES%: 9 (ref 2–11)
EXT NITRITES UA: ABNORMAL
EXT PHOSPHORUS: 3 (ref 2.3–4.7)
EXT PLATELETS: 142 (ref 150–375)
EXT POTASSIUM: 5.3 (ref 3.5–5.1)
EXT PROT/CREAT RATIO UR: 0.34
EXT PROTEIN TOTAL: 6.8 (ref 6–8.3)
EXT PROTEIN UA: ABNORMAL
EXT RBC UA: NEGATIVE
EXT SEGS%: 63 (ref 44–81)
EXT SODIUM: 137 MMOL/L (ref 136–145)
EXT TACROLIMUS LVL: 4.6 (ref 5–20)
EXT WBC UA: ABNORMAL
EXT WBC: 5.2 (ref 4–11)

## 2019-08-16 NOTE — TELEPHONE ENCOUNTER
Call placed, labs reviewed by Dr. Meredith. Patient with high K+, will decrease K+ in diet, repeat labs 8/26 due to work schedule. Annual transplant Clinic appointment will be deferred.     Annual Transplant follow up assessment    Call placed to annual post transplant patient for health assessment and evaluation of need for annual transplant clinic visit.    -Are you following with a General Nephrologist? If so, who, and when was your last visit?  Dr. Pete Mejia/ [facility], last seen 2 months ago.    -Have you had any hospitalizations since your last visit?  No hospitalizations.     -What is your current dose of Immunos? Who prescribed your last refill?  Current Meds: Prograf 1mg bid; MMF 500mg bid.     -Do you have any new health problems?  Denies    -Have you been told you have any form of Cancer?  Denies    -Have you had any recurrent infections?  Denies

## 2019-09-17 ENCOUNTER — TELEPHONE (OUTPATIENT)
Dept: TRANSPLANT | Facility: CLINIC | Age: 49
End: 2019-09-17

## 2019-09-17 NOTE — TELEPHONE ENCOUNTER
Call placed to patient, K+ level outstanding, missed appointment for labs draw X2. Patient agreeable to complete lab on 9/23 as he reports that he has been working long hours.

## 2019-09-24 ENCOUNTER — TELEPHONE (OUTPATIENT)
Dept: TRANSPLANT | Facility: CLINIC | Age: 49
End: 2019-09-24

## 2019-09-24 ENCOUNTER — DOCUMENTATION ONLY (OUTPATIENT)
Dept: TRANSPLANT | Facility: CLINIC | Age: 49
End: 2019-09-24

## 2019-09-24 LAB
EXT BUN: 19 (ref 7–20)
EXT CALCIUM: 9 (ref 8.5–10.1)
EXT CHLORIDE: 103 (ref 98–107)
EXT CO2: 27 (ref 21–32)
EXT CREATININE: 1.6 MG/DL (ref 0.4–1.1)
EXT GFR MDRD NON AF AMER: 49
EXT GLUCOSE: 117 (ref 70–115)
EXT POTASSIUM: 5.4 (ref 3.6–5)
EXT SODIUM: 140 MMOL/L (ref 135–145)

## 2019-09-24 NOTE — TELEPHONE ENCOUNTER
Call placed to Patient. Potassium level higher than expected. Patient encouraged to increase fluid intake and read food labels to decrease K+ in diet. Will sent labs to Dr. Mejia for follow up.

## 2019-09-24 NOTE — PROGRESS NOTES
Please advise low K diet and communicate with his nephrologist office for follow up of this elevated Potassium, patient is on MARIANA blocker that can increase K+  Encourage hydration

## 2019-09-25 ENCOUNTER — TELEPHONE (OUTPATIENT)
Dept: TRANSPLANT | Facility: CLINIC | Age: 49
End: 2019-09-25

## 2019-09-25 NOTE — TELEPHONE ENCOUNTER
----- Message from Caterina Schuster RN sent at 9/25/2019 11:02 AM CDT -----  Contact: Ms Grant /   [facility] [phone]      ----- Message -----  From: Melanie Noble  Sent: 9/25/2019  10:54 AM CDT  To: [facility] Staff    Type:  Ms Puentes states need new order from doctor  for patient lab with date of 9/20/2019  Fax over to [phone] doctor.  Name of Caller: Ms grant  When is the first available appointment?  Symptoms:  Would the patient rather a call back or a response via [facility]? call  Best Call Back Number:[phone]  Additional Information:

## 2020-08-10 LAB
EXT ALBUMIN: 4.33
EXT ALKALINE PHOSPHATASE: 60.7
EXT ALT: 39
EXT AST: 30.7
EXT BACTERIA UA: ABNORMAL
EXT BILIRUBIN TOTAL: 0.57
EXT BUN: 28.1
EXT CALCIUM: 9.65
EXT CHLORIDE: 100.1
EXT CO2: 26.3
EXT CREATININE: 1.78 MG/DL
EXT EOSINOPHIL%: 0.4
EXT GFR MDRD NON AF AMER: 40.47
EXT GLUCOSE UA: ABNORMAL
EXT GLUCOSE: 186
EXT HEMATOCRIT: 43.8
EXT HEMOGLOBIN: 14.5
EXT LYMPH%: 20.8
EXT MAGNESIUM: 1.9
EXT MONOCYTES%: 8.4
EXT NITRITES UA: ABNORMAL
EXT PHOSPHORUS: 4.3
EXT PLATELETS: 156
EXT POTASSIUM: 5.01
EXT PROT/CREAT RATIO UR: 0.12
EXT PROTEIN TOTAL: 7.6
EXT PROTEIN UA: ABNORMAL
EXT RBC UA: ABNORMAL
EXT SEGS%: 63.9
EXT SODIUM: 136 MMOL/L
EXT TACROLIMUS LVL: 4.4
EXT WBC UA: ABNORMAL
EXT WBC: 6.78

## 2020-08-12 ENCOUNTER — DOCUMENTATION ONLY (OUTPATIENT)
Dept: TRANSPLANT | Facility: CLINIC | Age: 50
End: 2020-08-12

## 2020-08-21 ENCOUNTER — OFFICE VISIT (OUTPATIENT)
Dept: TRANSPLANT | Facility: CLINIC | Age: 50
End: 2020-08-21
Payer: COMMERCIAL

## 2020-08-21 VITALS
HEART RATE: 70 BPM | RESPIRATION RATE: 18 BRPM | HEIGHT: 69 IN | DIASTOLIC BLOOD PRESSURE: 84 MMHG | WEIGHT: 257.06 LBS | SYSTOLIC BLOOD PRESSURE: 127 MMHG | OXYGEN SATURATION: 99 % | TEMPERATURE: 98 F | BODY MASS INDEX: 38.07 KG/M2

## 2020-08-21 DIAGNOSIS — E66.01 CLASS 2 SEVERE OBESITY DUE TO EXCESS CALORIES WITH SERIOUS COMORBIDITY IN ADULT, UNSPECIFIED BMI: ICD-10-CM

## 2020-08-21 DIAGNOSIS — Z94.0 IMMUNOSUPPRESSIVE MANAGEMENT ENCOUNTER FOLLOWING KIDNEY TRANSPLANT: Chronic | ICD-10-CM

## 2020-08-21 DIAGNOSIS — D84.9 IMMUNOCOMPROMISED STATE: ICD-10-CM

## 2020-08-21 DIAGNOSIS — Z79.899 IMMUNOSUPPRESSIVE MANAGEMENT ENCOUNTER FOLLOWING KIDNEY TRANSPLANT: Chronic | ICD-10-CM

## 2020-08-21 DIAGNOSIS — I12.9 HYPERTENSION, RENAL: ICD-10-CM

## 2020-08-21 DIAGNOSIS — N18.30 CKD (CHRONIC KIDNEY DISEASE) STAGE 3, GFR 30-59 ML/MIN: ICD-10-CM

## 2020-08-21 DIAGNOSIS — Z94.0 LIVING-DONOR KIDNEY TRANSPLANT RECIPIENT: Primary | Chronic | ICD-10-CM

## 2020-08-21 DIAGNOSIS — N05.1 FOCAL SEGMENTAL GLOMERULOSCLEROSIS: Chronic | ICD-10-CM

## 2020-08-21 PROBLEM — E66.812 CLASS 2 SEVERE OBESITY DUE TO EXCESS CALORIES WITH SERIOUS COMORBIDITY IN ADULT: Status: ACTIVE | Noted: 2017-08-11

## 2020-08-21 PROCEDURE — 99999 PR PBB SHADOW E&M-EST. PATIENT-LVL IV: ICD-10-PCS | Mod: PBBFAC,,, | Performed by: NURSE PRACTITIONER

## 2020-08-21 PROCEDURE — 3074F SYST BP LT 130 MM HG: CPT | Mod: CPTII,S$GLB,, | Performed by: NURSE PRACTITIONER

## 2020-08-21 PROCEDURE — 3079F PR MOST RECENT DIASTOLIC BLOOD PRESSURE 80-89 MM HG: ICD-10-PCS | Mod: CPTII,S$GLB,, | Performed by: NURSE PRACTITIONER

## 2020-08-21 PROCEDURE — 3008F PR BODY MASS INDEX (BMI) DOCUMENTED: ICD-10-PCS | Mod: CPTII,S$GLB,, | Performed by: NURSE PRACTITIONER

## 2020-08-21 PROCEDURE — 3079F DIAST BP 80-89 MM HG: CPT | Mod: CPTII,S$GLB,, | Performed by: NURSE PRACTITIONER

## 2020-08-21 PROCEDURE — 3074F PR MOST RECENT SYSTOLIC BLOOD PRESSURE < 130 MM HG: ICD-10-PCS | Mod: CPTII,S$GLB,, | Performed by: NURSE PRACTITIONER

## 2020-08-21 PROCEDURE — 99215 OFFICE O/P EST HI 40 MIN: CPT | Mod: S$GLB,,, | Performed by: NURSE PRACTITIONER

## 2020-08-21 PROCEDURE — 3008F BODY MASS INDEX DOCD: CPT | Mod: CPTII,S$GLB,, | Performed by: NURSE PRACTITIONER

## 2020-08-21 PROCEDURE — 99999 PR PBB SHADOW E&M-EST. PATIENT-LVL IV: CPT | Mod: PBBFAC,,, | Performed by: NURSE PRACTITIONER

## 2020-08-21 PROCEDURE — 99215 PR OFFICE/OUTPT VISIT, EST, LEVL V, 40-54 MIN: ICD-10-PCS | Mod: S$GLB,,, | Performed by: NURSE PRACTITIONER

## 2020-08-21 NOTE — PATIENT INSTRUCTIONS
It is my privilege to participate in your transplant care! Please be sure to let us know if you have any questions or concerns about your health care - we cannot help you if we do not know. Don't forget we are on call 24/7 for any emergencies.     Best Wishes,  Iona PIEDRAC

## 2021-01-08 ENCOUNTER — PATIENT MESSAGE (OUTPATIENT)
Dept: TRANSPLANT | Facility: CLINIC | Age: 51
End: 2021-01-08

## 2021-01-15 NOTE — PROGRESS NOTES
Please send the report to the nephrologist for medical management of proteinuria  Wt control MARIANA blockers  BP control nonproductive cough

## 2021-07-07 DIAGNOSIS — Z94.0 KIDNEY REPLACED BY TRANSPLANT: Primary | ICD-10-CM

## 2021-08-17 ENCOUNTER — DOCUMENTATION ONLY (OUTPATIENT)
Dept: TRANSPLANT | Facility: CLINIC | Age: 51
End: 2021-08-17

## 2021-08-17 ENCOUNTER — TELEPHONE (OUTPATIENT)
Dept: TRANSPLANT | Facility: CLINIC | Age: 51
End: 2021-08-17

## 2021-08-17 ENCOUNTER — PATIENT MESSAGE (OUTPATIENT)
Dept: TRANSPLANT | Facility: CLINIC | Age: 51
End: 2021-08-17

## 2021-08-17 LAB
CREATININE URINE: 99.6
EXT ALBUMIN: 3.96 (ref 3.5–5)
EXT ALKALINE PHOSPHATASE: 55.8 (ref 38–126)
EXT ALT: 32.4
EXT AST: 26.5 (ref 15–46)
EXT BACTERIA UA: ABNORMAL
EXT BILIRUBIN TOTAL: 0.64 (ref 0.2–1.3)
EXT BUN: 20.5 (ref 7–20)
EXT CALCIUM: 9.12 (ref 8.9–10.6)
EXT CHLORIDE: 103.1 (ref 100–112)
EXT CO2: 27.4 (ref 22–30)
EXT CREATININE: 1.78 MG/DL (ref 0.52–1.5)
EXT EOSINOPHIL%: 7.9 (ref 1–5)
EXT GFR MDRD NON AF AMER: 40.32
EXT GLUCOSE UA: ABNORMAL
EXT GLUCOSE: 146 (ref 74–106)
EXT HEMATOCRIT: 43.6 (ref 42–52)
EXT HEMOGLOBIN: 14.3 (ref 14–18)
EXT LYMPH%: 21.9 (ref 20–40)
EXT MAGNESIUM: 1.8 (ref 1.6–2.3)
EXT MONOCYTES%: 9.8 (ref 3.5–12.5)
EXT NITRITES UA: ABNORMAL
EXT PHOSPHORUS: 3.3 (ref 2.5–4.5)
EXT PLATELETS: 149 (ref 130–400)
EXT POTASSIUM: 4.79 (ref 3.5–5.1)
EXT PROTEIN TOTAL: 7.1 (ref 6.3–8.2)
EXT PROTEIN UA: ABNORMAL
EXT RBC UA: ABNORMAL
EXT SEGS%: 59.4 (ref 40–65)
EXT SODIUM: 138.8 MMOL/L (ref 137–148)
EXT TACROLIMUS LVL: 5.4
EXT WBC UA: ABNORMAL
EXT WBC: 5.72 (ref 4.8–10.8)
PROT UR QL STRIP: 45.7 MG/DL
PROT/CREAT RATIO, UR: 0.46 MG/G CRT

## 2021-08-23 ENCOUNTER — OFFICE VISIT (OUTPATIENT)
Dept: TRANSPLANT | Facility: CLINIC | Age: 51
End: 2021-08-23
Payer: COMMERCIAL

## 2021-08-23 VITALS
OXYGEN SATURATION: 98 % | BODY MASS INDEX: 37.52 KG/M2 | HEIGHT: 69 IN | TEMPERATURE: 97 F | HEART RATE: 61 BPM | RESPIRATION RATE: 17 BRPM | SYSTOLIC BLOOD PRESSURE: 163 MMHG | DIASTOLIC BLOOD PRESSURE: 80 MMHG | WEIGHT: 253.31 LBS

## 2021-08-23 DIAGNOSIS — Z79.899 IMMUNOSUPPRESSIVE MANAGEMENT ENCOUNTER FOLLOWING KIDNEY TRANSPLANT: Chronic | ICD-10-CM

## 2021-08-23 DIAGNOSIS — I12.9 HYPERTENSION, RENAL: ICD-10-CM

## 2021-08-23 DIAGNOSIS — Z94.0 IMMUNOSUPPRESSIVE MANAGEMENT ENCOUNTER FOLLOWING KIDNEY TRANSPLANT: Chronic | ICD-10-CM

## 2021-08-23 DIAGNOSIS — Z94.0 LIVING-DONOR KIDNEY TRANSPLANT RECIPIENT: Primary | Chronic | ICD-10-CM

## 2021-08-23 DIAGNOSIS — N05.1 FOCAL SEGMENTAL GLOMERULOSCLEROSIS: Chronic | ICD-10-CM

## 2021-08-23 DIAGNOSIS — N18.32 STAGE 3B CHRONIC KIDNEY DISEASE: ICD-10-CM

## 2021-08-23 DIAGNOSIS — Z91.89 AT RISK FOR OPPORTUNISTIC INFECTIONS: ICD-10-CM

## 2021-08-23 PROCEDURE — 3077F PR MOST RECENT SYSTOLIC BLOOD PRESSURE >= 140 MM HG: ICD-10-PCS | Mod: CPTII,S$GLB,, | Performed by: NURSE PRACTITIONER

## 2021-08-23 PROCEDURE — 1126F AMNT PAIN NOTED NONE PRSNT: CPT | Mod: CPTII,S$GLB,, | Performed by: NURSE PRACTITIONER

## 2021-08-23 PROCEDURE — 1159F MED LIST DOCD IN RCRD: CPT | Mod: CPTII,S$GLB,, | Performed by: NURSE PRACTITIONER

## 2021-08-23 PROCEDURE — 3008F BODY MASS INDEX DOCD: CPT | Mod: CPTII,S$GLB,, | Performed by: NURSE PRACTITIONER

## 2021-08-23 PROCEDURE — 99215 PR OFFICE/OUTPT VISIT, EST, LEVL V, 40-54 MIN: ICD-10-PCS | Mod: S$GLB,,, | Performed by: NURSE PRACTITIONER

## 2021-08-23 PROCEDURE — 1126F PR PAIN SEVERITY QUANTIFIED, NO PAIN PRESENT: ICD-10-PCS | Mod: CPTII,S$GLB,, | Performed by: NURSE PRACTITIONER

## 2021-08-23 PROCEDURE — 3008F PR BODY MASS INDEX (BMI) DOCUMENTED: ICD-10-PCS | Mod: CPTII,S$GLB,, | Performed by: NURSE PRACTITIONER

## 2021-08-23 PROCEDURE — 1159F PR MEDICATION LIST DOCUMENTED IN MEDICAL RECORD: ICD-10-PCS | Mod: CPTII,S$GLB,, | Performed by: NURSE PRACTITIONER

## 2021-08-23 PROCEDURE — 3079F PR MOST RECENT DIASTOLIC BLOOD PRESSURE 80-89 MM HG: ICD-10-PCS | Mod: CPTII,S$GLB,, | Performed by: NURSE PRACTITIONER

## 2021-08-23 PROCEDURE — 99999 PR PBB SHADOW E&M-EST. PATIENT-LVL V: ICD-10-PCS | Mod: PBBFAC,,, | Performed by: NURSE PRACTITIONER

## 2021-08-23 PROCEDURE — 99999 PR PBB SHADOW E&M-EST. PATIENT-LVL V: CPT | Mod: PBBFAC,,, | Performed by: NURSE PRACTITIONER

## 2021-08-23 PROCEDURE — 3077F SYST BP >= 140 MM HG: CPT | Mod: CPTII,S$GLB,, | Performed by: NURSE PRACTITIONER

## 2021-08-23 PROCEDURE — 99215 OFFICE O/P EST HI 40 MIN: CPT | Mod: S$GLB,,, | Performed by: NURSE PRACTITIONER

## 2021-08-23 PROCEDURE — 3079F DIAST BP 80-89 MM HG: CPT | Mod: CPTII,S$GLB,, | Performed by: NURSE PRACTITIONER

## 2021-08-23 RX ORDER — MYCOPHENOLATE MOFETIL 250 MG/1
CAPSULE ORAL
Qty: 120 CAPSULE | Refills: 11 | Status: SHIPPED | OUTPATIENT
Start: 2021-08-23

## 2021-08-23 RX ORDER — TACROLIMUS 1 MG/1
CAPSULE ORAL
Qty: 60 CAPSULE | Refills: 11 | Status: SHIPPED | OUTPATIENT
Start: 2021-08-23

## 2021-09-07 ENCOUNTER — IMMUNIZATION (OUTPATIENT)
Dept: HEMATOLOGY/ONCOLOGY | Facility: CLINIC | Age: 51
End: 2021-09-07
Payer: COMMERCIAL

## 2021-09-07 DIAGNOSIS — Z23 NEED FOR VACCINATION: Primary | ICD-10-CM

## 2021-09-07 PROCEDURE — 0003A COVID-19, MRNA, LNP-S, PF, 30 MCG/0.3 ML DOSE VACCINE: ICD-10-PCS | Mod: CV19,S$GLB,, | Performed by: FAMILY MEDICINE

## 2021-09-07 PROCEDURE — 0003A COVID-19, MRNA, LNP-S, PF, 30 MCG/0.3 ML DOSE VACCINE: CPT | Mod: CV19,S$GLB,, | Performed by: FAMILY MEDICINE

## 2021-09-07 PROCEDURE — 91300 COVID-19, MRNA, LNP-S, PF, 30 MCG/0.3 ML DOSE VACCINE: ICD-10-PCS | Mod: S$GLB,,, | Performed by: FAMILY MEDICINE

## 2021-09-07 PROCEDURE — 91300 COVID-19, MRNA, LNP-S, PF, 30 MCG/0.3 ML DOSE VACCINE: CPT | Mod: S$GLB,,, | Performed by: FAMILY MEDICINE

## 2022-05-11 ENCOUNTER — PATIENT MESSAGE (OUTPATIENT)
Dept: RESEARCH | Facility: CLINIC | Age: 52
End: 2022-05-11
Payer: COMMERCIAL

## 2022-08-15 ENCOUNTER — HISTORICAL (OUTPATIENT)
Dept: ADMINISTRATIVE | Facility: HOSPITAL | Age: 52
End: 2022-08-15

## 2022-11-18 ENCOUNTER — HISTORICAL (OUTPATIENT)
Dept: ADMINISTRATIVE | Facility: HOSPITAL | Age: 52
End: 2022-11-18

## 2025-05-09 ENCOUNTER — HOSPITAL ENCOUNTER (INPATIENT)
Facility: HOSPITAL | Age: 55
LOS: 1 days | Discharge: HOME HEALTH | DRG: 563 | End: 2025-05-09
Attending: INTERNAL MEDICINE | Admitting: SPECIALIST
Payer: COMMERCIAL

## 2025-05-09 VITALS
WEIGHT: 250 LBS | RESPIRATION RATE: 18 BRPM | SYSTOLIC BLOOD PRESSURE: 104 MMHG | TEMPERATURE: 98 F | HEART RATE: 73 BPM | BODY MASS INDEX: 37.89 KG/M2 | HEIGHT: 68 IN | OXYGEN SATURATION: 99 % | DIASTOLIC BLOOD PRESSURE: 65 MMHG

## 2025-05-09 DIAGNOSIS — T07.XXXA FRACTURES: ICD-10-CM

## 2025-05-09 DIAGNOSIS — S82.842A CLOSED BIMALLEOLAR FRACTURE OF LEFT ANKLE, INITIAL ENCOUNTER: ICD-10-CM

## 2025-05-09 DIAGNOSIS — S92.001A CLOSED DISPLACED FRACTURE OF RIGHT CALCANEUS, UNSPECIFIED PORTION OF CALCANEUS, INITIAL ENCOUNTER: ICD-10-CM

## 2025-05-09 DIAGNOSIS — S82.55XS CLOSED NONDISPLACED FRACTURE OF MEDIAL MALLEOLUS OF LEFT TIBIA, SEQUELA: ICD-10-CM

## 2025-05-09 DIAGNOSIS — S92.122B: ICD-10-CM

## 2025-05-09 DIAGNOSIS — S92.002A CLOSED DISPLACED FRACTURE OF LEFT CALCANEUS, UNSPECIFIED PORTION OF CALCANEUS, INITIAL ENCOUNTER: ICD-10-CM

## 2025-05-09 DIAGNOSIS — S92.011A CLOSED DISPLACED FRACTURE OF BODY OF RIGHT CALCANEUS, INITIAL ENCOUNTER: Primary | ICD-10-CM

## 2025-05-09 PROBLEM — S92.122A: Status: ACTIVE | Noted: 2025-05-09

## 2025-05-09 PROBLEM — S82.52XA: Status: ACTIVE | Noted: 2025-05-09

## 2025-05-09 LAB
ANION GAP SERPL CALC-SCNC: 9 MEQ/L
BASOPHILS # BLD AUTO: 0.01 X10(3)/MCL
BASOPHILS NFR BLD AUTO: 0.2 %
BUN SERPL-MCNC: 25 MG/DL (ref 8.4–25.7)
CALCIUM SERPL-MCNC: 8.7 MG/DL (ref 8.4–10.2)
CHLORIDE SERPL-SCNC: 101 MMOL/L (ref 98–107)
CO2 SERPL-SCNC: 27 MMOL/L (ref 22–29)
CREAT SERPL-MCNC: 1.76 MG/DL (ref 0.72–1.25)
CREAT/UREA NIT SERPL: 14
EOSINOPHIL # BLD AUTO: 0.27 X10(3)/MCL (ref 0–0.9)
EOSINOPHIL NFR BLD AUTO: 4.5 %
ERYTHROCYTE [DISTWIDTH] IN BLOOD BY AUTOMATED COUNT: 14.4 % (ref 11.5–17)
GFR SERPLBLD CREATININE-BSD FMLA CKD-EPI: 45 ML/MIN/1.73/M2
GLUCOSE SERPL-MCNC: 103 MG/DL (ref 74–100)
HCT VFR BLD AUTO: 47.8 % (ref 42–52)
HGB BLD-MCNC: 15.5 G/DL (ref 14–18)
IMM GRANULOCYTES # BLD AUTO: 0.01 X10(3)/MCL (ref 0–0.04)
IMM GRANULOCYTES NFR BLD AUTO: 0.2 %
INR PPP: 1
LYMPHOCYTES # BLD AUTO: 1.16 X10(3)/MCL (ref 0.6–4.6)
LYMPHOCYTES NFR BLD AUTO: 19.3 %
MCH RBC QN AUTO: 32 PG (ref 27–31)
MCHC RBC AUTO-ENTMCNC: 32.4 G/DL (ref 33–36)
MCV RBC AUTO: 98.6 FL (ref 80–94)
MONOCYTES # BLD AUTO: 0.79 X10(3)/MCL (ref 0.1–1.3)
MONOCYTES NFR BLD AUTO: 13.2 %
NEUTROPHILS # BLD AUTO: 3.76 X10(3)/MCL (ref 2.1–9.2)
NEUTROPHILS NFR BLD AUTO: 62.6 %
PLATELET # BLD AUTO: 109 X10(3)/MCL (ref 130–400)
PMV BLD AUTO: 10 FL (ref 7.4–10.4)
POCT GLUCOSE: 94 MG/DL (ref 70–110)
POTASSIUM SERPL-SCNC: 4.3 MMOL/L (ref 3.5–5.1)
PROTHROMBIN TIME: 14 SECONDS (ref 12.4–14.9)
RBC # BLD AUTO: 4.85 X10(6)/MCL (ref 4.7–6.1)
SODIUM SERPL-SCNC: 137 MMOL/L (ref 136–145)
WBC # BLD AUTO: 6 X10(3)/MCL (ref 4.5–11.5)

## 2025-05-09 PROCEDURE — 96375 TX/PRO/DX INJ NEW DRUG ADDON: CPT

## 2025-05-09 PROCEDURE — 63600175 PHARM REV CODE 636 W HCPCS: Performed by: INTERNAL MEDICINE

## 2025-05-09 PROCEDURE — 96374 THER/PROPH/DIAG INJ IV PUSH: CPT

## 2025-05-09 PROCEDURE — 29515 APPLICATION SHORT LEG SPLINT: CPT | Mod: 50

## 2025-05-09 PROCEDURE — 99285 EMERGENCY DEPT VISIT HI MDM: CPT | Mod: 25

## 2025-05-09 PROCEDURE — 80048 BASIC METABOLIC PNL TOTAL CA: CPT | Performed by: INTERNAL MEDICINE

## 2025-05-09 PROCEDURE — 85025 COMPLETE CBC W/AUTO DIFF WBC: CPT | Performed by: INTERNAL MEDICINE

## 2025-05-09 PROCEDURE — 25000003 PHARM REV CODE 250: Performed by: INTERNAL MEDICINE

## 2025-05-09 PROCEDURE — 11000001 HC ACUTE MED/SURG PRIVATE ROOM

## 2025-05-09 PROCEDURE — 97161 PT EVAL LOW COMPLEX 20 MIN: CPT

## 2025-05-09 PROCEDURE — 94761 N-INVAS EAR/PLS OXIMETRY MLT: CPT

## 2025-05-09 PROCEDURE — 85610 PROTHROMBIN TIME: CPT | Performed by: INTERNAL MEDICINE

## 2025-05-09 RX ORDER — GLUCAGON 1 MG
1 KIT INJECTION
Status: DISCONTINUED | OUTPATIENT
Start: 2025-05-09 | End: 2025-05-09 | Stop reason: HOSPADM

## 2025-05-09 RX ORDER — ONDANSETRON HYDROCHLORIDE 2 MG/ML
4 INJECTION, SOLUTION INTRAVENOUS
Status: COMPLETED | OUTPATIENT
Start: 2025-05-09 | End: 2025-05-09

## 2025-05-09 RX ORDER — HYDROMORPHONE HYDROCHLORIDE 2 MG/ML
1 INJECTION, SOLUTION INTRAMUSCULAR; INTRAVENOUS; SUBCUTANEOUS
Refills: 0 | Status: COMPLETED | OUTPATIENT
Start: 2025-05-09 | End: 2025-05-09

## 2025-05-09 RX ORDER — OXYCODONE AND ACETAMINOPHEN 10; 325 MG/1; MG/1
1 TABLET ORAL EVERY 6 HOURS PRN
Qty: 28 TABLET | Refills: 0 | Status: SHIPPED | OUTPATIENT
Start: 2025-05-09

## 2025-05-09 RX ORDER — HYDROCHLOROTHIAZIDE 25 MG/1
25 TABLET ORAL DAILY
COMMUNITY

## 2025-05-09 RX ORDER — ACETAMINOPHEN 325 MG/1
650 TABLET ORAL EVERY 8 HOURS PRN
Status: DISCONTINUED | OUTPATIENT
Start: 2025-05-09 | End: 2025-05-09 | Stop reason: HOSPADM

## 2025-05-09 RX ORDER — SODIUM CHLORIDE 0.9 % (FLUSH) 0.9 %
10 SYRINGE (ML) INJECTION
Status: DISCONTINUED | OUTPATIENT
Start: 2025-05-09 | End: 2025-05-09 | Stop reason: HOSPADM

## 2025-05-09 RX ORDER — HYDRALAZINE HYDROCHLORIDE 20 MG/ML
10 INJECTION INTRAMUSCULAR; INTRAVENOUS EVERY 4 HOURS PRN
Status: DISCONTINUED | OUTPATIENT
Start: 2025-05-09 | End: 2025-05-09 | Stop reason: HOSPADM

## 2025-05-09 RX ORDER — SODIUM CHLORIDE 9 MG/ML
INJECTION, SOLUTION INTRAVENOUS CONTINUOUS
Status: DISCONTINUED | OUTPATIENT
Start: 2025-05-09 | End: 2025-05-09 | Stop reason: HOSPADM

## 2025-05-09 RX ORDER — AMOXICILLIN 250 MG
1 CAPSULE ORAL 2 TIMES DAILY
Status: DISCONTINUED | OUTPATIENT
Start: 2025-05-09 | End: 2025-05-09 | Stop reason: HOSPADM

## 2025-05-09 RX ORDER — HYDROMORPHONE HYDROCHLORIDE 2 MG/ML
1 INJECTION, SOLUTION INTRAMUSCULAR; INTRAVENOUS; SUBCUTANEOUS EVERY 4 HOURS PRN
Status: DISCONTINUED | OUTPATIENT
Start: 2025-05-09 | End: 2025-05-09 | Stop reason: HOSPADM

## 2025-05-09 RX ORDER — OXYCODONE HYDROCHLORIDE 5 MG/1
5 TABLET ORAL EVERY 4 HOURS PRN
Status: DISCONTINUED | OUTPATIENT
Start: 2025-05-09 | End: 2025-05-09 | Stop reason: HOSPADM

## 2025-05-09 RX ORDER — ONDANSETRON HYDROCHLORIDE 2 MG/ML
4 INJECTION, SOLUTION INTRAVENOUS EVERY 8 HOURS PRN
Status: DISCONTINUED | OUTPATIENT
Start: 2025-05-09 | End: 2025-05-09 | Stop reason: HOSPADM

## 2025-05-09 RX ORDER — TAMSULOSIN HYDROCHLORIDE 0.4 MG/1
0.4 CAPSULE ORAL DAILY
COMMUNITY

## 2025-05-09 RX ORDER — ROSUVASTATIN CALCIUM 40 MG/1
40 TABLET, COATED ORAL NIGHTLY
COMMUNITY

## 2025-05-09 RX ORDER — INSULIN ASPART 100 [IU]/ML
0-5 INJECTION, SOLUTION INTRAVENOUS; SUBCUTANEOUS EVERY 6 HOURS PRN
Status: DISCONTINUED | OUTPATIENT
Start: 2025-05-09 | End: 2025-05-09 | Stop reason: HOSPADM

## 2025-05-09 RX ADMIN — HYDROMORPHONE HYDROCHLORIDE 1 MG: 2 INJECTION INTRAMUSCULAR; INTRAVENOUS; SUBCUTANEOUS at 07:05

## 2025-05-09 RX ADMIN — SODIUM CHLORIDE: 9 INJECTION, SOLUTION INTRAVENOUS at 03:05

## 2025-05-09 RX ADMIN — ONDANSETRON 4 MG: 2 INJECTION INTRAMUSCULAR; INTRAVENOUS at 02:05

## 2025-05-09 RX ADMIN — HYDROMORPHONE HYDROCHLORIDE 1 MG: 2 INJECTION INTRAMUSCULAR; INTRAVENOUS; SUBCUTANEOUS at 11:05

## 2025-05-09 RX ADMIN — HYDROMORPHONE HYDROCHLORIDE 1 MG: 2 INJECTION INTRAMUSCULAR; INTRAVENOUS; SUBCUTANEOUS at 02:05

## 2025-05-09 RX ADMIN — ONDANSETRON 4 MG: 2 INJECTION INTRAMUSCULAR; INTRAVENOUS at 07:05

## 2025-05-09 RX ADMIN — OXYCODONE 5 MG: 5 TABLET ORAL at 08:05

## 2025-05-09 NOTE — SUBJECTIVE & OBJECTIVE
Dr. London  [address] [place] [id number]  [phone]   Past Medical History:   Diagnosis Date    Anemia of chronic renal failure     CKD (chronic kidney disease) stage 4, GFR 15-29 ml/min     Depression     Focal segmental glomerulosclerosis 1986    biopsy proven    Gout, chronic     Hypertension, renal     Immunocompromised state 8/26/2018    Kidney transplant status, living related donor     Living-donor kidney transplant recipient 8/6/2012    Need for prophylactic immunotherapy 8/31/2012    Secondary hyperparathyroidism of renal origin     Vitamin D deficiency disease        Past Surgical History:   Procedure Laterality Date    ANKLE LIGAMENT RECONSTRUCTION Right 2003    KIDNEY TRANSPLANT  8/6/12       Review of patient's allergies indicates:   Allergen Reactions    Iodine      Other reaction(s): Swelling    Pcn [penicillins] Other (See Comments)     Reports tingling with PCN       Current Facility-Administered Medications   Medication    0.9% NaCl infusion    acetaminophen tablet 650 mg    dextrose 50% injection 12.5 g    glucagon (human recombinant) injection 1 mg    hydrALAZINE injection 10 mg    HYDROmorphone (PF) injection 1 mg    insulin aspart U-100 injection 0-5 Units    ondansetron injection 4 mg    oxyCODONE immediate release tablet 5 mg    senna-docusate 8.6-50 mg per tablet 1 tablet    sodium chloride 0.9% flush 10 mL     Family History       Problem Relation (Age of Onset)    Diabetes Father    Kidney disease Mother, Paternal Aunt          Tobacco Use    Smoking status: Former     Current packs/day: 0.00     Average packs/day: 2.0 packs/day for 15.0 years (30.0 ttl pk-yrs)     Types: Cigarettes     Start date: 1/1/1995     Quit date: 1/1/2010     Years since quitting: 15.3    Smokeless tobacco: Former     Types: Snuff   Substance and Sexual Activity    Alcohol use: No     Comment: Quit in April, drank for 25 yrs.    Drug use: No    Sexual activity: Not on file     Review of Systems   Constitutional: Negative.   HENT: Negative.     Eyes: Negative.   "  Cardiovascular: Negative.    Respiratory: Negative.     Endocrine: Negative.    Skin: Negative.    Musculoskeletal:  Positive for joint pain.   Gastrointestinal: Negative.    Genitourinary: Negative.    Neurological: Negative.    Psychiatric/Behavioral: Negative.     Allergic/Immunologic: Negative.      Objective:     Vital Signs (Most Recent):  Temp: 97.8 °F (36.6 °C) (05/09/25 0740)  Pulse: 69 (05/09/25 0740)  Resp: 18 (05/09/25 0711)  BP: 125/78 (05/09/25 0740)  SpO2: 98 % (05/09/25 0740) Vital Signs (24h Range):  Temp:  [97.8 °F (36.6 °C)-97.9 °F (36.6 °C)] 97.8 °F (36.6 °C)  Pulse:  [69-70] 69  Resp:  [16-20] 18  SpO2:  [97 %-99 %] 98 %  BP: (125-144)/(78-86) 125/78     Weight: 113.4 kg (250 lb)  Height: 5' 8" (172.7 cm)  Body mass index is 38.01 kg/m².      Intake/Output Summary (Last 24 hours) at 5/9/2025 0822  Last data filed at 5/9/2025 0527  Gross per 24 hour   Intake 185.45 ml   Output --   Net 185.45 ml        General    Vitals reviewed.  Constitutional: He is oriented to person, place, and time. He appears well-developed and well-nourished.   HENT:   Head: Atraumatic.   Eyes: Pupils are equal, round, and reactive to light.   Pulmonary/Chest: Effort normal.   Abdominal: Soft.   Neurological: He is oriented to person, place, and time.   Psychiatric: He has a normal mood and affect.         Right Ankle/Foot Exam     Comments:  Right foot and ankle is and a well-padded posterior splint satisfactory alignment.  He can wiggle his toes well dorsiflexion and plantar flexion.  Sensation is intact to the toes and he has good capillary refill.    Left Ankle/Foot Exam     Comments:  Left foot and ankle is in a posterior splint well padded with a stirrup.  That has well-positioned.  He can move his toes well and he has good capillary refill and sensation.         Significant Labs: All pertinent labs within the past 24 hours have been reviewed.    Significant Imaging: CT: I have reviewed all pertinent " results/findings and my personal findings are:  CT scan of both ankles reveal on the left a comminuted calcaneus fracture, talus fracture, medial malleolus avulsion.  CT of the right ankle shows a comminuted calcaneus fracture all in satisfactory alignment  X-Ray: I have reviewed all pertinent results/findings and my personal findings are:  X-rays both ankles reveal fractures as described above but overall satisfactory alignment

## 2025-05-09 NOTE — PLAN OF CARE
Problem: Physical Therapy  Goal: Physical Therapy Goal  Description: Goals to be met by: discharge     Patient will increase functional independence with mobility by performin. Supine to sit with Modified Brogan  2. Bed to chair transfer with Modified Brogan using Slideboard  3. Wheelchair propulsion x150 feet with Supervision and Stand-by Assistance using bilateral upper extremities    Outcome: Progressing

## 2025-05-09 NOTE — PLAN OF CARE
New referral sent over to [facility], [facility], and [facility] . Adv that the pt is discharging on today and to let me know if they accept so that I can send over the d/c order      2:06 [facility] accepted the pt and discharge order was sent over via fax

## 2025-05-09 NOTE — HPI
54-year-old male was working on his [employer] and [facility].  He fell 10 ft after the porch collapsed and he fell directly on both feet.  He was evaluated in a hospital in [facility] and found to have bilateral ankle fractures.  He was placed in a well-padded splints.  He is from [place] and he want to get back home so he signed out AMA and drove home.  EN route he called his primary physician Dr. Carlos Alberto Ahuja to track me down.  I have done previous surgery on him in the past.  He was admitted last night through the ER here in [place].  He had been placed in bilateral well-padded posterior splints.  He denies any back pain.  He denies any loss of consciousness.  He just has severe bilateral ankle and hindfoot pain.

## 2025-05-09 NOTE — PT/OT/SLP EVAL
Physical Therapy Evaluation    Patient Name:  Irving Dockery   MRN:  [id number]    Recommendations:     Discharge Recommendations: Low Intensity Therapy (patient would benefit from outpatient physical therapy when allowed)   Discharge Equipment Recommendations: wheelchair, slide board   Barriers to discharge: current medical status    Assessment:     Irving Dockery is a 54 y.o. male admitted with a medical diagnosis of Fracture of body of left talus.  He presents with the following impairments/functional limitations: weakness, decreased lower extremity function, pain, impaired functional mobility, impaired self care skills     Patient found with HOB elevated. He is agreeable to PT evaluation this morning. Patient completed supine to sit, SBA. Once sitting, therapist educated patient on NWB status of BLE, and also educated patient on slideboard transfers. Patient performed bed to chair transfer using slideboard, CGA. Patient then able to perform WC propulsion x120 feet with BUE, SBA. Patient demo safe WC management. Patient does have help of family available at home. Patient would benefit from outpatient physical therapy once weightbearing is allowed.    Rehab Prognosis: Good; patient would benefit from acute skilled PT services to address these deficits and reach maximum level of function.    Recent Surgery: * No surgery found *      Plan:     During this hospitalization, patient to be seen 5 x/week to address the identified rehab impairments via therapeutic activities, therapeutic exercises, wheelchair management/training and progress toward the following goals:    Plan of Care Expires:  06/09/25    Subjective     Chief Complaint: none verbalized at this time  Patient/Family Comments/goals: to go home  Pain/Comfort:       Patients cultural, spiritual, Uatsdin conflicts given the current situation:      Living Environment:  Home alone, sister and godson live next door  Prior to admission, patients level of  function was independent.  Equipment used at home: none.  DME owned (not currently used): none.  Upon discharge, patient will have assistance from family.    Objective:     Communicated with nursing prior to session.  Patient found HOB elevated with bed alarm, peripheral IV  upon PT entry to room.    General Precautions: Standard, fall  Orthopedic Precautions:RLE non weight bearing, LLE non weight bearing   Braces:    Respiratory Status: Room air    Exams:  RLE ROM: hip and knee ROM WNL  RLE Strength: hip and knee WNL  LLE ROM: hip and knee WNL  LLE Strength: hip and knee WNL    Functional Mobility:  Bed Mobility:     Supine to Sit: stand by assistance  Sit to Supine: minimum assistance and moderate assistance  Transfers:     Bed to Chair: contact guard assistance with  slide board  using  Slide Board  Wheelchair Propulsion:  Pt propelled Standard wheelchair x 120 feet on Level tile with  Bilateral upper extremity with Stand-by Assistance.       AM-PAC 6 CLICK MOBILITY  Total Score:        Treatment & Education:  See above    Patient left HOB elevated with all lines intact, call button in reach, bed alarm on, and nurse notified.    GOALS:   Multidisciplinary Problems       Physical Therapy Goals          Problem: Physical Therapy    Goal Priority Disciplines Outcome Interventions   Physical Therapy Goal     PT, PT/OT Progressing    Description: Goals to be met by: discharge     Patient will increase functional independence with mobility by performin. Supine to sit with Modified Bonner  2. Bed to chair transfer with Modified Bonner using Slideboard  3. Wheelchair propulsion x150 feet with Supervision and Stand-by Assistance using bilateral upper extremities                         DME Justifications:  Irving Dockery has a mobility limitation that significantly impairs his ability to participate in one or more mobility related activities of daily living (MRADLs) such as toileting, feeding, dressing,  grooming, and bathing in customary locations in the home.  The mobility limitation cannot be sufficiently resolved by the use of a cane or walker.   The use of a manual wheelchair will significantly improve the patients ability to participate in MRADLS and the patient will use it on regular basis in the home.  Irving Dockery has expressed his willingness to use a manual wheelchair in the home. Patients upper body strength is sufficient for propulsion.  He also has a caregiver who is available, willing, and able to provide assistance with the wheelchair when needed.      History:     Past Medical History:   Diagnosis Date    Anemia of chronic renal failure     CKD (chronic kidney disease) stage 4, GFR 15-29 ml/min     Depression     Focal segmental glomerulosclerosis 1986    biopsy proven    Gout, chronic     Hypertension, renal     Immunocompromised state 8/26/2018    Kidney transplant status, living related donor     Living-donor kidney transplant recipient 8/6/2012    Need for prophylactic immunotherapy 8/31/2012    Secondary hyperparathyroidism of renal origin     Vitamin D deficiency disease        Past Surgical History:   Procedure Laterality Date    ANKLE LIGAMENT RECONSTRUCTION Right 2003    KIDNEY TRANSPLANT  8/6/12       Time Tracking:     PT Received On: 05/09/25  PT Start Time: 0910     PT Stop Time: 0930  PT Total Time (min): 20 min     Billable Minutes: Evaluation 20 05/09/2025

## 2025-05-09 NOTE — H&P
[facility] - [facility]  Orthopedics  H&P    Patient Name: Irving Dockery  MRN: [id number]  Admission Date: 5/9/2025  Primary Care Provider: Tobias Ahuja III, MD    Patient information was obtained from patient and ER records.     Subjective:     Principal Problem:Fracture of body of left talus    Chief Complaint: No chief complaint on file.       HPI: 54-year-old male was working on his [employer] and [facility].  He fell 10 ft after the porch collapsed and he fell directly on both feet.  He was evaluated in a hospital in [facility] and found to have bilateral ankle fractures.  He was placed in a well-padded splints.  He is from [place] and he want to get back home so he signed out AMA and drove home.  EN route he called his primary physician Dr. Carlos Alberto Ahuja to track me down.  I have done previous surgery on him in the past.  He was admitted last night through the ER here in [place].  He had been placed in bilateral well-padded posterior splints.  He denies any back pain.  He denies any loss of consciousness.  He just has severe bilateral ankle and hindfoot pain.    Past Medical History:   Diagnosis Date    Anemia of chronic renal failure     CKD (chronic kidney disease) stage 4, GFR 15-29 ml/min     Depression     Focal segmental glomerulosclerosis 1986    biopsy proven    Gout, chronic     Hypertension, renal     Immunocompromised state 8/26/2018    Kidney transplant status, living related donor     Living-donor kidney transplant recipient 8/6/2012    Need for prophylactic immunotherapy 8/31/2012    Secondary hyperparathyroidism of renal origin     Vitamin D deficiency disease        Past Surgical History:   Procedure Laterality Date    ANKLE LIGAMENT RECONSTRUCTION Right 2003    KIDNEY TRANSPLANT  8/6/12       Review of patient's allergies indicates:   Allergen Reactions    Iodine      Other reaction(s): Swelling    Pcn [penicillins] Other (See Comments)     Reports  "tingling with PCN       Current Facility-Administered Medications   Medication    0.9% NaCl infusion    acetaminophen tablet 650 mg    dextrose 50% injection 12.5 g    glucagon (human recombinant) injection 1 mg    hydrALAZINE injection 10 mg    HYDROmorphone (PF) injection 1 mg    insulin aspart U-100 injection 0-5 Units    ondansetron injection 4 mg    oxyCODONE immediate release tablet 5 mg    senna-docusate 8.6-50 mg per tablet 1 tablet    sodium chloride 0.9% flush 10 mL     Family History       Problem Relation (Age of Onset)    Diabetes Father    Kidney disease Mother, Paternal Aunt          Tobacco Use    Smoking status: Former     Current packs/day: 0.00     Average packs/day: 2.0 packs/day for 15.0 years (30.0 ttl pk-yrs)     Types: Cigarettes     Start date: 1/1/1995     Quit date: 1/1/2010     Years since quitting: 15.3    Smokeless tobacco: Former     Types: Snuff   Substance and Sexual Activity    Alcohol use: No     Comment: Quit in April, drank for 25 yrs.    Drug use: No    Sexual activity: Not on file     Review of Systems   Constitutional: Negative.   HENT: Negative.     Eyes: Negative.    Cardiovascular: Negative.    Respiratory: Negative.     Endocrine: Negative.    Skin: Negative.    Musculoskeletal:  Positive for joint pain.   Gastrointestinal: Negative.    Genitourinary: Negative.    Neurological: Negative.    Psychiatric/Behavioral: Negative.     Allergic/Immunologic: Negative.      Objective:     Vital Signs (Most Recent):  Temp: 97.8 °F (36.6 °C) (05/09/25 0740)  Pulse: 69 (05/09/25 0740)  Resp: 18 (05/09/25 0711)  BP: 125/78 (05/09/25 0740)  SpO2: 98 % (05/09/25 0740) Vital Signs (24h Range):  Temp:  [97.8 °F (36.6 °C)-97.9 °F (36.6 °C)] 97.8 °F (36.6 °C)  Pulse:  [69-70] 69  Resp:  [16-20] 18  SpO2:  [97 %-99 %] 98 %  BP: (125-144)/(78-86) 125/78     Weight: 113.4 kg (250 lb)  Height: 5' 8" (172.7 cm)  Body mass index is 38.01 kg/m².      Intake/Output Summary (Last 24 hours) at 5/9/2025 " 0822  Last data filed at 5/9/2025 0527  Gross per 24 hour   Intake 185.45 ml   Output --   Net 185.45 ml        General    Vitals reviewed.  Constitutional: He is oriented to person, place, and time. He appears well-developed and well-nourished.   HENT:   Head: Atraumatic.   Eyes: Pupils are equal, round, and reactive to light.   Pulmonary/Chest: Effort normal.   Abdominal: Soft.   Neurological: He is oriented to person, place, and time.   Psychiatric: He has a normal mood and affect.         Right Ankle/Foot Exam     Comments:  Right foot and ankle is and a well-padded posterior splint satisfactory alignment.  He can wiggle his toes well dorsiflexion and plantar flexion.  Sensation is intact to the toes and he has good capillary refill.    Left Ankle/Foot Exam     Comments:  Left foot and ankle is in a posterior splint well padded with a stirrup.  That has well-positioned.  He can move his toes well and he has good capillary refill and sensation.         Significant Labs: All pertinent labs within the past 24 hours have been reviewed.    Significant Imaging: CT: I have reviewed all pertinent results/findings and my personal findings are:  CT scan of both ankles reveal on the left a comminuted calcaneus fracture, talus fracture, medial malleolus avulsion.  CT of the right ankle shows a comminuted calcaneus fracture all in satisfactory alignment  X-Ray: I have reviewed all pertinent results/findings and my personal findings are:  X-rays both ankles reveal fractures as described above but overall satisfactory alignment  Assessment/Plan:     * Fracture of body of left talus  Fracture of the talus with a satisfactory alignment.  Recommending nonoperative management.  Strict nonweightbearing.    Fracture of medial malleolus of left tibia  Fracture of the medial malleolus of the left with a avulsion.  Recommend nonoperative management with strict nonweightbearing.    Closed fracture of body of right calcaneus  Fracture of  the right calcaneus which is comminuted    Fracture of calcaneus, left, closed  Fracture of the left calcaneus which was comminuted.  I have recommended nonoperative management.  Strict nonweightbearing.        Benjy Cardozo MD  Orthopedics  [facility]

## 2025-05-09 NOTE — ED PROVIDER NOTES
Encounter Date: 5/9/2025       History     Chief Complaint   Patient presents with    Ankle Pain     Bilateral ankle fx after falling out of a deer stand      54-year-old white male was throwing some would often his deer stand when he lost his balance and fell off the ladder proximally 10 ft landing flat footed causing fractures to bilateral feet and ankles.  He presented to the hospital him and row and did not want to have surgery there so he signed out against medical advice and drove all the way to [place].  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    Gout, chronic     Hypertension, renal     Immunocompromised state 8/26/2018    Kidney transplant status, living related donor     Living-donor kidney transplant recipient 8/6/2012    Need for prophylactic immunotherapy 8/31/2012    Secondary hyperparathyroidism of renal origin     Vitamin D deficiency disease      Past Surgical History:   Procedure Laterality Date    ANKLE LIGAMENT RECONSTRUCTION Right 2003    KIDNEY TRANSPLANT  8/6/12     Family History   Problem Relation Name Age of Onset    Diabetes Father      Kidney disease Mother      Kidney disease Paternal Aunt       Social History[1]  Review of Systems   Constitutional: Negative.  Negative for activity change, appetite change, chills, diaphoresis, fatigue, fever and unexpected weight change.   HENT: Negative.  Negative for congestion, dental problem, drooling, ear discharge, ear pain, facial  swelling, hearing loss, mouth sores, nosebleeds, postnasal drip, rhinorrhea, sinus pressure, sinus pain, sneezing, sore throat, tinnitus, trouble swallowing and voice change.    Eyes: Negative.  Negative for photophobia, pain, discharge, redness, itching and visual disturbance.   Respiratory: Negative.  Negative for apnea, cough, choking, chest tightness, shortness of breath, wheezing and stridor.    Cardiovascular: Negative.  Negative for chest pain, palpitations and leg swelling.   Gastrointestinal: Negative.  Negative for abdominal distention, abdominal pain, anal bleeding, blood in stool, constipation, diarrhea, nausea, rectal pain and vomiting.   Endocrine: Negative.  Negative for cold intolerance, heat intolerance, polydipsia, polyphagia and polyuria.   Genitourinary: Negative.  Negative for decreased urine volume, difficulty urinating, dysuria, enuresis, flank pain, frequency, genital sores, hematuria, penile discharge, penile pain, penile swelling, scrotal swelling, testicular pain and urgency.   Musculoskeletal: Negative.  Negative for arthralgias, back pain, gait problem, joint swelling, myalgias, neck pain and neck stiffness.        Bilateral foot and ankle pain   Skin: Negative.  Negative for color change, pallor, rash and wound.   Allergic/Immunologic: Negative.  Negative for environmental allergies, food allergies and immunocompromised state.   Neurological: Negative.  Negative for dizziness, tremors, seizures, syncope, facial asymmetry, speech difficulty, weakness, light-headedness, numbness and headaches.   Hematological: Negative.  Negative for adenopathy. Does not bruise/bleed easily.   Psychiatric/Behavioral: Negative.  Negative for agitation, behavioral problems, confusion, decreased concentration, dysphoric mood, hallucinations, self-injury, sleep disturbance and suicidal ideas. The patient is not nervous/anxious and is not hyperactive.    All other systems reviewed and are  negative.      Physical Exam     Initial Vitals [05/09/25 0035]   BP Pulse Resp Temp SpO2   (!) 144/86 69 16 97.8 °F (36.6 °C) 97 %      MAP       --         Physical Exam    Nursing note and vitals reviewed.  Constitutional: He appears well-developed and well-nourished.   HENT:   Head: Normocephalic and atraumatic.   Eyes: Conjunctivae and EOM are normal. Pupils are equal, round, and reactive to light.   Neck: Neck supple.   Normal range of motion.  Cardiovascular:  Normal rate and regular rhythm.           Pulmonary/Chest: Breath sounds normal.   Abdominal: Abdomen is soft. Bowel sounds are normal.   Musculoskeletal:         General: Normal range of motion.      Cervical back: Normal range of motion and neck supple.        Legs:       Comments: Bilateral ortho glass casts on the lower extremities     Neurological: He is alert and oriented to person, place, and time.   Skin: Skin is warm and dry. Capillary refill takes less than 2 seconds.   Psychiatric: He has a normal mood and affect. His behavior is normal. Judgment and thought content normal.         ED Course   Procedures  Labs Reviewed   BASIC METABOLIC PANEL - Abnormal       Result Value    Sodium 137      Potassium 4.3      Chloride 101      CO2 27      Glucose 103 (*)     Blood Urea Nitrogen 25.0      Creatinine 1.76 (*)     BUN/Creatinine Ratio 14      Calcium 8.7      Anion Gap 9.0      eGFR 45     CBC WITH DIFFERENTIAL - Abnormal    WBC 6.00      RBC 4.85      Hgb 15.5      Hct 47.8      MCV 98.6 (*)     MCH 32.0 (*)     MCHC 32.4 (*)     RDW 14.4      Platelet 109 (*)     MPV 10.0      Neut % 62.6      Lymph % 19.3      Mono % 13.2      Eos % 4.5      Basophil % 0.2      Imm Grans % 0.2      Neut # 3.76      Lymph # 1.16      Mono # 0.79      Eos # 0.27      Baso # 0.01      Imm Gran # 0.01     PROTIME-INR - Normal    PT 14.0      INR 1.0      Narrative:     Protimes are used to monitor anticoagulant agents such as warfarin. PT INR values are based on  the current patient normal mean and the BINH value for the specific instrument reagent used.  **Routine theraputic target values for the INR are 2.0-3.0**   CBC W/ AUTO DIFFERENTIAL    Narrative:     The following orders were created for panel order CBC auto differential.  Procedure                               Abnormality         Status                     ---------                               -----------         ------                     CBC with Differential[[id number]]       Abnormal            Final result                 Please view results for these tests on the individual orders.          Imaging Results              X-Ray Ankle Complete Bilateral (Preliminary result)  Result time 05/09/25 02:10:00      Wet Read by Valerio Mauro MD (05/09/25 02:10:00, [facility] - Emergency Dept, Emergency Medicine)    Bilateral comminuted calcaneal fractures, left bimalleolar fracture                                     Medications   HYDROmorphone (PF) injection 1 mg (1 mg Intravenous Given 5/9/25 0207)   ondansetron injection 4 mg (4 mg Intravenous Given 5/9/25 0206)     Medical Decision Making  54-year-old white male with bilateral ankle and foot pain.  He presented to another facility prior to coming here and do not want to have surgery so he was placed in bilateral ortho glass splint and he signed out against medical advice and presented to [facility].  His differential includes open fracture versus closed fracture versus, unit or dislocation.  X-rays were done here and shows bimalleolar fracture of the left ankle and comminuted fracture of the left calcaneus with a comminuted fracture of the right calcaneus as well.  I have called and spoke with Dr. Cardozo who requests to admit to him and he will take the surgery in the morning    Amount and/or Complexity of Data Reviewed  Labs: ordered.  Radiology: ordered.    Risk  Prescription drug management.  Decision regarding hospitalization.                 "                      Clinical Impression:  Final diagnoses:  [T07.XXXA] Fractures  [S82.522A] Closed bimalleolar fracture of left ankle, initial encounter (Primary)  [S92.002A] Closed displaced fracture of left calcaneus, unspecified portion of calcaneus, initial encounter  [S92.001A] Closed displaced fracture of right calcaneus, unspecified portion of calcaneus, initial encounter          ED Disposition Condition    Admit             Portions of this note have been created with voice recognition software. Occasional "wrong-words" or "sound alike" substitutions may have occurred due to inherent limitations of voice software. Please read the note carefully and recognize, using context, word substitutions may have occurred.              [1]   Social History  Tobacco Use    Smoking status: Former     Current packs/day: 0.00     Average packs/day: 2.0 packs/day for 15.0 years (30.0 ttl pk-yrs)     Types: Cigarettes     Start date: 1/1/1995     Quit date: 1/1/2010     Years since quitting: 15.3    Smokeless tobacco: Former     Types: Snuff   Substance Use Topics    Alcohol use: No     Comment: Quit in April, drank for 25 yrs.    Drug use: No        Valerio Mauro MD  05/09/25 0211    "

## 2025-05-09 NOTE — ASSESSMENT & PLAN NOTE
Fracture of the left calcaneus which was comminuted.  I have recommended nonoperative management.  Strict nonweightbearing.

## 2025-05-09 NOTE — PLAN OF CARE
05/09/25 1128   Discharge Assessment   Assessment Type Discharge Planning Assessment   Confirmed/corrected address, phone number and insurance Yes   Confirmed Demographics Correct on Facesheet   Source of Information patient   People in Home significant other   Do you expect to return to your current living situation? Yes   Do you have help at home or someone to help you manage your care at home? Yes   Who are your caregiver(s) and their phone number(s)? sig other   Prior to hospitilization cognitive status: Alert/Oriented;No Deficits   Current cognitive status: No Deficits;Alert/Oriented   Walking or Climbing Stairs Difficulty yes   Walking or Climbing Stairs ambulation difficulty, dependent;stair climbing difficulty, dependent;transferring difficulty, dependent   Mobility Management currently no weight bearing on both ankels   Dressing/Bathing Difficulty yes   Dressing/Bathing bathing difficulty, assistance 1 person;dressing difficulty, assistance 1 person   Dressing/Bathing Management sig other   Equipment Currently Used at Home none   Do you currently have service(s) that help you manage your care at home? No   Do you take prescription medications? Yes   Do you have prescription coverage? Yes   Do you have any problems affording any of your prescribed medications? No   Is the patient taking medications as prescribed? yes   Who is going to help you get home at discharge? sig other   How do you get to doctors appointments? family or friend will provide   Are you on dialysis? No   Do you take coumadin? No   Discharge Plan A Home with family   Discharge Plan B Home with family   DME Needed Upon Discharge  bedside commode;hospital bed;slide board;wheelchair   Discharge Plan discussed with: Patient;Spouse/sig other   Transition of Care Barriers None   Physical Activity   On average, how many minutes do you engage in exercise at this level? 0 min   Financial Resource Strain   How hard is it for you to pay for the  very basics like food, housing, medical care, and heating? Not very   Housing Stability   In the last 12 months, was there a time when you were not able to pay the mortgage or rent on time? N   At any time in the past 12 months, were you homeless or living in a shelter (including now)? N   Transportation Needs   In the past 12 months, has lack of transportation kept you from medical appointments or from getting medications? no   In the past 12 months, has lack of transportation kept you from meetings, work, or from getting things needed for daily living? No   Food Insecurity   Within the past 12 months, you worried that your food would run out before you got the money to buy more. Never true   Within the past 12 months, the food you bought just didn't last and you didn't have money to get more. Never true   Stress   Do you feel stress - tense, restless, nervous, or anxious, or unable to sleep at night because your mind is troubled all the time - these days? Only a littl   Social Isolation   How often do you feel lonely or isolated from those around you?  Rarely   Alcohol Use   Q1: How often do you have a drink containing alcohol? Never   Q2: How many drinks containing alcohol do you have on a typical day when you are drinking? None   Q3: How often do you have six or more drinks on one occasion? Never   Utilities   In the past 12 months has the electric, gas, oil, or water company threatened to shut off services in your home? No   Health Literacy   How often do you need to have someone help you when you read instructions, pamphlets, or other written material from your doctor or pharmacy? Rarely

## 2025-05-09 NOTE — ASSESSMENT & PLAN NOTE
Fracture of the talus with a satisfactory alignment.  Recommending nonoperative management.  Strict nonweightbearing.

## 2025-05-09 NOTE — PLAN OF CARE
Patient needing home health and wants to use [facility].  Referral sent per PELON Marquez.

## 2025-05-09 NOTE — ASSESSMENT & PLAN NOTE
Fracture of the medial malleolus of the left with a avulsion.  Recommend nonoperative management with strict nonweightbearing.

## 2025-05-09 NOTE — PLAN OF CARE
Orders for DME sent to [facility].  Informed patient that I am waiting to hear from [facility] to see if they are in network to provide the DME he needs for d/c.

## 2025-05-13 ENCOUNTER — PATIENT OUTREACH (OUTPATIENT)
Dept: ADMINISTRATIVE | Facility: CLINIC | Age: 55
End: 2025-05-13
Payer: COMMERCIAL

## 2025-05-13 NOTE — PROGRESS NOTES
C3 nurse attempted to contact Irving Dockery for a TCC post hospital discharge follow up call. No answer. LVM requesting a callback at [phone].    The patient does not have a scheduled HOSFU appointment. Message sent to PCP's staff to assist with HOSFU appointment scheduling.

## 2025-05-13 NOTE — PROGRESS NOTES
C3 nurse spoke with [facility] Dockery for a TCC post hospital discharge follow up call. The patient does not have a scheduled HOSFU appointment with Tobias Ahuja III, MD within 5-7 days post hospital discharge date 5/9/25. C3 nurse was unable to schedule HOSFU appointment in [facility].  Message sent to PCP staff requesting they contact patient and schedule follow up appointment.  The patient does have an appt with Benjy Cardozo MD (Orthopedic Surgery) on 05/14/2025 @4:30.

## 2025-05-13 NOTE — DISCHARGE SUMMARY
"[facility]  Medical Surgical Unit  Discharge Note  Short Stay    * No surgery found *      OUTCOME: Patient tolerated treatment/procedure well without complication and is now ready for discharge.    DISPOSITION: Home-Health Care [facility]    FINAL DIAGNOSIS:  Fracture of body of left talus    FOLLOWUP: In clinic    DISCHARGE INSTRUCTIONS:    Discharge Procedure Orders   HOSPITAL BED FOR HOME USE   Order Comments: Patient is non-weight bearing on both ankles.     Order Specific Question Answer Comments   Type: Semi-electric    Length of need (1-99 months): 99    Height: 5' 8" (1.727 m)    Weight: 113.4 kg (250 lb)    Please check all that apply: Patient requires positioning of the body in ways not feasible in an ordinary bed due to a medical condition which is expected to last at least one month.    Please check all that apply: Patient requires, for the alleviation of pain, positioning of the body in ways not feasible in an ordinary bed.    Please check all that apply: Patient requires frequent changes in body position and/or has an immediate need for a change in body position.      WHEELCHAIR FOR HOME USE   Order Comments: Patient has bilateral ankle fractures which he is non-weight bearing.     Order Specific Question Answer Comments   Hours in W/C per day: 12    Type of Wheelchair: Standard    Size(Width): 18"(STD adult)    Leg Support: STD footrests    Leg Support: Swing Away    Leg Support: Elevating leg rests    Lap Belt: Velcro    Accessories: Anti-tippers    Cushion: Basic    Reclining Back No    Height: 5' 8" (1.727 m)    Weight: 113.4 kg (250 lb)    Length of need (1-99 months): 99    Please check all that apply: Caregiver is capable and willing to operate wheelchair safely.    Please check all that apply: Patient's upper body strength is sufficient for propulsion.    Please check all that apply: The patient has significant edema of the lower extremities that requires an elevating leg rest.    Please " "check all that apply: The patient requires the use of a w/c for activities of daily living within the Home.    Please check all that apply: Patient mobility limitations cannot be sufficiently resolved by the use of other ambulatory therapies.      SLIDING BOARD FOR HOME USE     Order Specific Question Answer Comments   Height: 5' 8" (1.727 m)    Weight: 113.4 kg (250 lb)    Length of need (1-99 months): 99    Reason for Sliding Board: Patient has a wheelchair patient will be getting a hospital bed and a wheelchair at home and is non-weight bearing on both fractured ankles   Need for transfer? Yes    Is the patient non-ambulatory? Yes    Is the patient chair bound? Yes    Is the patient bed bound? Yes      COMMODE FOR HOME USE     Order Specific Question Answer Comments   Type: Standard    Height: 5' 8" (1.727 m)    Weight: 113.4 kg (250 lb)    Length of need (1-99 months): 99      Ambulatory referral/consult to Home Health   Standing Status: Future   Referral Priority: Routine Referral Type: Home Health   Referral Reason: Specialty Services Required   Requested Specialty: Home Health Services   Number of Visits Requested: 1        TIME SPENT ON DISCHARGE: 15 minutes  "